# Patient Record
Sex: FEMALE | Race: WHITE | Employment: OTHER | URBAN - METROPOLITAN AREA
[De-identification: names, ages, dates, MRNs, and addresses within clinical notes are randomized per-mention and may not be internally consistent; named-entity substitution may affect disease eponyms.]

---

## 2018-01-31 ENCOUNTER — TELEPHONE (OUTPATIENT)
Dept: DERMATOLOGY | Facility: AMBULATORY SURGERY CENTER | Age: 73
End: 2018-01-31

## 2018-01-31 NOTE — TELEPHONE ENCOUNTER
Mohs Pre-Op Assessment    Patient Appointment Date: Mon 2/12 @ 2    Erenest Level, 68 y.o., female      does confirm site: lower posterior neck  Brief description of tumor: BCC  does not ID site. (Can they still visibly see the site)  does not have Hepatitis C   does not have HIV (If YES, set up consult appointment)    Allergies:  No Known Allergies    does not have an Electrical Implanted Device (Pacemaker, AICD, brain stimulator, etc.)    does not need antibiotics      is not taking NSAIDs    is taking aspirin  If yes, is taking because of prevention (i.e. heart attack, stroke, TIA, bypass surgery, etc.)    is not taking Garlic  is not taking Ginkgo  is not taking Ginseng  is not taking Fish oils  is not taking Vit E    does not take a blood thinner(i.e. Coumadin/Warfarin, Plavix, Brilinta, Pradaxa, Xarelto, Effient)  If taking Coumadin needs to have PT/INR drawn and faxed results within a week of surgery    Pre operative assessment questions asked to patient. Patient has a general understanding of the procedure, and has been versed that there will be local anesthesia used in the procedure and that She will be ok to drive themselves to and from the appointment. Patient has been notified to arrive 15-20 minutes early and they may eat or drink before arriving.

## 2018-02-12 ENCOUNTER — OFFICE VISIT (OUTPATIENT)
Dept: DERMATOLOGY | Facility: AMBULATORY SURGERY CENTER | Age: 73
End: 2018-02-12

## 2018-02-12 VITALS
OXYGEN SATURATION: 97 % | HEART RATE: 65 BPM | RESPIRATION RATE: 16 BRPM | HEIGHT: 63 IN | BODY MASS INDEX: 28.35 KG/M2 | TEMPERATURE: 98.4 F | SYSTOLIC BLOOD PRESSURE: 126 MMHG | WEIGHT: 160 LBS | DIASTOLIC BLOOD PRESSURE: 86 MMHG

## 2018-02-12 DIAGNOSIS — C44.41 BASAL CELL CARCINOMA OF NECK: Primary | ICD-10-CM

## 2018-02-12 RX ORDER — PANTOPRAZOLE SODIUM 40 MG/1
40 TABLET, DELAYED RELEASE ORAL DAILY
COMMUNITY

## 2018-02-12 RX ORDER — ATORVASTATIN CALCIUM 20 MG/1
TABLET, FILM COATED ORAL DAILY
COMMUNITY

## 2018-02-12 NOTE — PROGRESS NOTES
Beau Jimenez is a 68 y.o. female    No chief complaint on file. 1. Have you been to the ER, urgent care clinic since your last visit? Hospitalized since your last visit? no    2. Have you seen or consulted any other health care providers outside of the [address] [place] [address] since your last visit? Include any pap smears or colon screening.   No

## 2018-02-12 NOTE — PATIENT INSTRUCTIONS
WOUND CARE INSTRUCTIONS    1. Keep the dressing clean and dry and do not remove for 48 hours. 2. Then change the dressing once a day as follows:  a. Wash hands before and after each dressing change. b. Remove dressing and wash site gently with mild soap and water, rinse, and pat dry.  c. Apply an ointment (Bacitracin, Polysporin, Neosporin, Petroleum jelly or Aquaphor). d. Apply a non-stick (Telfa) dressing or Band-Aid to cover the wound. Remove pressure bandage on wednesday, then wash site gently. Leave steri-strips in place until it naturally begins to peel off, about 4-6 days, then remove. If steri-strips peel off prior to 1 week, apply vaseline and a band-aid to site daily for the remainder of the week. 3. Watch for:  BLEEDING: A small amount of drainage may occur. If bleeding occurs, elevate and rest the surgery site. Apply gauze and steady pressure for 15 minutes. If bleeding continues, call this office. INFECTION: Signs of infection include increased redness, pain, warmth, drainage of pus, and fever. If this occurs, call this office. 4. Special Instructions (follow any that are checked):  · [x] You have stitches that DO NOT need to be removed. · [x] Avoid bending at the waist and heavy lifting for two days. · [x] Sleep with your head elevated for the next two nights. · [x] Rest the surgery site and keep it elevated as much as possible for two days. · [] You may apply an ice-pack for 10-15 minutes every waking hour for the rest of the day. · [] Eat a soft diet and avoid hot food and hot drinks for the rest of the day. · [] Other instructions: Follow up as directed. Take Tylenol or Ibuprofen for pain as needed. Once the site is healed with no remaining bandages or open areas, protect your surgical site and scar from the sun, as this area will be more sensitive.   Use a broad spectrum sunscreen SPF 30 or higher daily, and a chemical free product (one containing zinc oxide or titanium dioxide) is a good choice if the area is sensitive. You may begin to gently massage the surgical site in 2-3 weeks, rubbing in a circular motion along the scar. This can help reduce swelling and thickness of a scar. A scar cream may be used beginnning 1 month after the surgery. If you have any questions or concerns, please call our office Monday through Friday at [phone].

## 2018-02-12 NOTE — MR AVS SNAPSHOT
[address] [facility] [address] [place] [id number] Patient: Rocío Jacob MRN: [id number] Visit Information Date & Time Provider Department Dept. Phone Encounter #  
 2/12/2018  2:00 PM MD Bryan Yoonjanelle [id number] [phone] [id number] Upcoming Health Maintenance Date Due Hepatitis C Screening 1945 DTaP/Tdap/Td series (1 - Tdap) 1/18/1966 FOBT Q 1 YEAR AGE 50-75 1/18/1995 ZOSTER VACCINE AGE 60> 11/18/2004 GLAUCOMA SCREENING Q2Y 1/18/2010 Pneumococcal 65+ Low/Medium Risk (1 of 2 - PCV13) 1/18/2010 MEDICARE YEARLY EXAM 1/18/2010 Influenza Age 5 to Adult 8/1/2017 BREAST CANCER SCRN MAMMOGRAM 10/14/2018 Allergies as of 2/12/2018  Review Complete On: 2/12/2018 By: Gita Richard MD  
 No Known Allergies Current Immunizations  Never Reviewed No immunizations on file. Not reviewed this visit You Were Diagnosed With   
  
 Codes Comments Basal cell carcinoma of neck    -  Primary ICD-10-CM: C44.41 
ICD-9-CM: 173.41 Vitals BP Pulse Temp Resp Height(growth percentile) Weight(growth percentile) 126/86 (BP 1 Location: Left arm, BP Patient Position: Sitting) 65 98.4 °F (36.9 °C) (Oral) 16 5' 3\" (1.6 m) 160 lb (72.6 kg) SpO2 BMI Smoking Status 97% 28.34 kg/m2 Never Smoker Vitals History BMI and BSA Data Body Mass Index Body Surface Area  
 28.34 kg/m 2 1.8 m 2 Preferred Pharmacy Pharmacy Name Phone [address] [facility] [phone] Your Updated Medication List  
  
   
This list is accurate as of: 2/12/18  3:30 PM.  Always use your most recent med list.  
  
  
  
  
 atorvastatin 20 mg tablet Commonly known as:  LIPITOR Take  by mouth daily. hydroCHLOROthiazide 25 mg tablet Commonly known as:  HYDRODIURIL  
 Take 25 mg by mouth daily. KLOR-CON M20 20 mEq tablet Generic drug:  potassium chloride Take 20 mEq by mouth daily. MIMVEY 1-0.5 mg per tablet Generic drug:  estradiol-norethindrone Take 1 Tab by mouth daily. pantoprazole 40 mg tablet Commonly known as:  PROTONIX Take 40 mg by mouth daily. pravastatin 40 mg tablet Commonly known as:  PRAVACHOL Take 40 mg by mouth daily. PREMPRO 0.3-1.5 mg Tab Generic drug:  estrogen (conjugated)-medroxyPROGESTERone Take 1 Tab by mouth daily. Patient Instructions WOUND CARE INSTRUCTIONS 1. Keep the dressing clean and dry and do not remove for 48 hours. 2. Then change the dressing once a day as follows: 
a. Wash hands before and after each dressing change. b. Remove dressing and wash site gently with mild soap and water, rinse, and pat dry. 
c. Apply an ointment (Bacitracin, Polysporin, Neosporin, Petroleum jelly or Aquaphor). d. Apply a non-stick (Telfa) dressing or Band-Aid to cover the wound. Remove pressure bandage on wednesday, then wash site gently. Leave steri-strips in place until it naturally begins to peel off, about 4-6 days, then remove. If steri-strips peel off prior to 1 week, apply vaseline and a band-aid to site daily for the remainder of the week. 3. Watch for: BLEEDING: A small amount of drainage may occur. If bleeding occurs, elevate and rest the surgery site. Apply gauze and steady pressure for 15 minutes. If bleeding continues, call this office. INFECTION: Signs of infection include increased redness, pain, warmth, drainage of pus, and fever. If this occurs, call this office. 4. Special Instructions (follow any that are checked): ·  You have stitches that DO NOT need to be removed. ·  Avoid bending at the waist and heavy lifting for two days. ·  Sleep with your head elevated for the next two nights.  
·  Rest the surgery site and keep it elevated as much as possible for two days. 
·  You may apply an ice-pack for 10-15 minutes every waking hour for the rest of the day. ·  Eat a soft diet and avoid hot food and hot drinks for the rest of the day. ·  Other instructions: Follow up as directed. Take Tylenol or Ibuprofen for pain as needed. Once the site is healed with no remaining bandages or open areas, protect your surgical site and scar from the sun, as this area will be more sensitive. Use a broad spectrum sunscreen SPF 30 or higher daily, and a chemical free product (one containing zinc oxide or titanium dioxide) is a good choice if the area is sensitive. You may begin to gently massage the surgical site in 2-3 weeks, rubbing in a circular motion along the scar. This can help reduce swelling and thickness of a scar. A scar cream may be used beginnning 1 month after the surgery. If you have any questions or concerns, please call our office Monday through Friday at [phone]. Introducing [facility]! Akira Vargas introduces [employer] patient portal. Now you can access parts of your medical record, email your doctor's office, and request medication refills online. 1. In your internet browser, go to [email or web address]. [facility] 2. Click on the First Time User? Click Here link in the Sign In box. You will see the New Member Sign Up page. 3. Enter your [employer] Access Code exactly as it appears below. You will not need to use this code after youve completed the sign-up process. If you do not sign up before the expiration date, you must request a new code. · [employer] Access Code: [email or web address] Expires: 5/6/2018  3:39 PM 
 
4. Enter the last four digits of your Social Security Number (xxxx) and Date of Birth (mm/dd/yyyy) as indicated and click Submit. You will be taken to the next sign-up page. 5. Create a [employer] ID. This will be your [employer] login ID and cannot be changed, so think of one that is secure and easy to remember. 6. Create a [employer] password. You can change your password at any time. 7. Enter your Password Reset Question and Answer. This can be used at a later time if you forget your password. 8. Enter your e-mail address. You will receive e-mail notification when new information is available in [address] 9. Click Sign Up. You can now view and download portions of your medical record. 10. Click the Download Summary menu link to download a portable copy of your medical information. If you have questions, please visit the Frequently Asked Questions section of the [employer] website. Remember, [employer] is NOT to be used for urgent needs. For medical emergencies, dial 911. Now available from your iPhone and Android! Please provide this summary of care documentation to your next provider. If you have any questions after today's visit, please call [phone].

## 2018-02-12 NOTE — PROGRESS NOTES
This note is written by Bing Shafer, as dictated by Tenzin Rosales. Saleem Batista MD.    CC: Basal cell carcinoma on the posterior base of neck     History of present illness:     Collette Climes is a 68 y.o. female referred by Dr. Sherita Longo. She has a biopsy-proven nodular and micronodular basal cell carcinoma on the posterior base of neck. This is a new basal cell carcinoma present for many months described as an itching lesion with no prior treatment. Biopsy confirmed the diagnosis of basal cell carcinoma, and I reviewed the written pathology. She is feeling well and in her usual state of health today. She has no pain, no current illnesses, no other skin concerns. Her allergies, medications, medical, and social history are reviewed by me today. Exam:     She is an awake, alert, and oriented 68 y.o. female who appears well and in no distress. There is no preauricular, submandibular, or cervical lymphadenopathy. I examined her neck. She has a 10 x 6 mm indistinct pink scar-like lesion on her posterior base of neck. She confirms location. Assessment/plan:    1. Basal cell carcinoma, posterior base of neck. I discussed the diagnosis of basal cell carcinoma and summarized the pathology report. Mohs surgery is indicated by site, size, poor definition, and histology. The procedure was discussed, verbal and written consent were obtained. I performed the procedure. One stage was required to reach a tumor free plane. The surgical defect was managed with intermediate repair. There were no complications. She will follow up as needed as the site heals. Indications, risks, and options were discussed with Collette Climes preoperatively. Risks including, but not limited to: pain, bleeding, infection, tumor recurrence, scarring and damage to motor and/or sensory nerves, were discussed. Collette Climes chose Mohs surgery. Collette Climes was an acceptable surgery candidate.     Collette Climes was placed in the appropriate position on the operating table in the Mohs surgery procedure room. The area was prepped and draped in the standard manner. Gentian violet was used to outline the clinical margins of the tumor. Local anesthesia was then obtained. The grossly visible tumor was then removed, an underlying layer was excised and mapped according to the Mohs technique, and the individual specimens examined microscopically. The process was repeated until microscopic examination of the tissue specimens confirmed a tumor-free plane. Hemostasis was obtained with electrosurgery and pressure. The wound was covered between stages with moist saline gauze. The wound management options of second intent healing, layered closure, local flap, and/or full thickness skin graft were discussed. Valarie Clemens understands the aims, risks, alternatives, and possible complications and elects to proceed with an intermediate layered closure. Wound margins were made vertical, edges undermined in the subcutaneous plane, standing cones corrected at both poles followed by layered closure. The wound was closed with buried 4-0 polysorb suture in the subcutis to reduce tension on the skin edges, and skin edges were approximated with 4-0 polysorb suture in the dermis to reduce tension on the epidermis. The final closure length was 31 mm. The wound was bandaged with steristrips, Telfa, gauze and Coverroll. Wound care instructions (written and verbal) and a follow up appointment were given to Valarie Clemens before discharge. Valarie Clemens was discharged in good condition. 2. History of nonmelanoma skin cancer. I discussed the diagnosis and recommend routine examinations with Dr. Jhonnie Fabry for surveillance. The documentation recorded by the scribe accurately reflects the service I personally performed and the decisions made by me.      [facility]   OFFICE PROCEDURE PROGRESS NOTE     Chart reviewed for the Hector Adams MD, have reviewed the History, Physical and updated the Allergic reactions for Fran performed immediately prior to start of procedure:     Linda Mendez. Andreas Adams MD, have performed the following reviews on Philomena Keane prior to the start of the procedure:     * Patient was identified by name and date of birth   * Agreement on procedure being performed was verified   * Risks and Benefits explained to the patient   * Procedure site verified and marked as necessary   * Patient was positioned for comfort   * Consent was signed and verified     Time: 2:24 PM   Date of procedure: 2/12/2018  Procedure performed by: Trang Adams MD   Provider assisted by: LPN   Patient assisted by: self   How tolerated by patient: tolerated the procedure well with no complications   Comments: none

## 2018-04-24 ENCOUNTER — HOSPITAL ENCOUNTER (OUTPATIENT)
Dept: MAMMOGRAPHY | Age: 73
Discharge: HOME | End: 2018-04-24
Attending: NURSE PRACTITIONER
Payer: MEDICARE

## 2018-04-24 DIAGNOSIS — Z12.39 BREAST SCREENING: ICD-10-CM

## 2018-04-24 PROCEDURE — 77067 SCR MAMMO BI INCL CAD: CPT

## 2019-01-11 ENCOUNTER — APPOINTMENT (OUTPATIENT)
Dept: GENERAL RADIOLOGY | Age: 74
DRG: 482 | End: 2019-01-11
Attending: EMERGENCY MEDICINE
Payer: MEDICARE

## 2019-01-11 ENCOUNTER — HOSPITAL ENCOUNTER (INPATIENT)
Age: 74
LOS: 4 days | Discharge: HOME HEALTH | DRG: 482 | End: 2019-01-15
Attending: EMERGENCY MEDICINE | Admitting: HOSPITALIST
Payer: MEDICARE

## 2019-01-11 DIAGNOSIS — S72.001A CLOSED FRACTURE OF RIGHT HIP, INITIAL ENCOUNTER (HCC): Primary | ICD-10-CM

## 2019-01-11 PROBLEM — S72.009A HIP FRACTURE (HCC): Status: ACTIVE | Noted: 2019-01-11

## 2019-01-11 LAB
ALBUMIN SERPL-MCNC: 3.6 G/DL (ref 3.5–5)
ALBUMIN/GLOB SERPL: 1 {RATIO} (ref 1.1–2.2)
ALP SERPL-CCNC: 74 U/L (ref 45–117)
ALT SERPL-CCNC: 22 U/L (ref 12–78)
ANION GAP SERPL CALC-SCNC: 14 MMOL/L (ref 5–15)
APPEARANCE UR: CLEAR
APTT PPP: 21.7 SEC (ref 22.1–32)
AST SERPL-CCNC: 23 U/L (ref 15–37)
BACTERIA URNS QL MICRO: NEGATIVE /HPF
BASOPHILS # BLD: 0 K/UL (ref 0–0.1)
BASOPHILS NFR BLD: 0 % (ref 0–1)
BILIRUB SERPL-MCNC: 0.6 MG/DL (ref 0.2–1)
BILIRUB UR QL: NEGATIVE
BUN SERPL-MCNC: 13 MG/DL (ref 6–20)
BUN/CREAT SERPL: 12 (ref 12–20)
CALCIUM SERPL-MCNC: 9.6 MG/DL (ref 8.5–10.1)
CHLORIDE SERPL-SCNC: 100 MMOL/L (ref 97–108)
CO2 SERPL-SCNC: 26 MMOL/L (ref 21–32)
COLOR UR: ABNORMAL
CREAT SERPL-MCNC: 1.06 MG/DL (ref 0.55–1.02)
CREAT SERPL-MCNC: 1.09 MG/DL (ref 0.55–1.02)
DIFFERENTIAL METHOD BLD: ABNORMAL
EOSINOPHIL # BLD: 0 K/UL (ref 0–0.4)
EOSINOPHIL NFR BLD: 0 % (ref 0–7)
EPITH CASTS URNS QL MICRO: ABNORMAL /LPF
ERYTHROCYTE [DISTWIDTH] IN BLOOD BY AUTOMATED COUNT: 13.6 % (ref 11.5–14.5)
GLOBULIN SER CALC-MCNC: 3.5 G/DL (ref 2–4)
GLUCOSE SERPL-MCNC: 136 MG/DL (ref 65–100)
GLUCOSE UR STRIP.AUTO-MCNC: NEGATIVE MG/DL
HCT VFR BLD AUTO: 37.9 % (ref 35–47)
HGB BLD-MCNC: 12.3 G/DL (ref 11.5–16)
HGB UR QL STRIP: NEGATIVE
IMM GRANULOCYTES # BLD AUTO: 0.2 K/UL (ref 0–0.04)
IMM GRANULOCYTES NFR BLD AUTO: 1 % (ref 0–0.5)
INR PPP: 1 (ref 0.9–1.1)
KETONES UR QL STRIP.AUTO: 15 MG/DL
LEUKOCYTE ESTERASE UR QL STRIP.AUTO: NEGATIVE
LYMPHOCYTES # BLD: 0.8 K/UL (ref 0.8–3.5)
LYMPHOCYTES NFR BLD: 5 % (ref 12–49)
MAGNESIUM SERPL-MCNC: 2 MG/DL (ref 1.6–2.4)
MCH RBC QN AUTO: 28 PG (ref 26–34)
MCHC RBC AUTO-ENTMCNC: 32.5 G/DL (ref 30–36.5)
MCV RBC AUTO: 86.1 FL (ref 80–99)
MONOCYTES # BLD: 0.8 K/UL (ref 0–1)
MONOCYTES NFR BLD: 5 % (ref 5–13)
NEUTS SEG # BLD: 14.1 K/UL (ref 1.8–8)
NEUTS SEG NFR BLD: 89 % (ref 32–75)
NITRITE UR QL STRIP.AUTO: NEGATIVE
NRBC # BLD: 0 K/UL (ref 0–0.01)
NRBC BLD-RTO: 0 PER 100 WBC
PH UR STRIP: 6 [PH] (ref 5–8)
PLATELET # BLD AUTO: 194 K/UL (ref 150–400)
PMV BLD AUTO: 10.5 FL (ref 8.9–12.9)
POTASSIUM SERPL-SCNC: 2.9 MMOL/L (ref 3.5–5.1)
PROT SERPL-MCNC: 7.1 G/DL (ref 6.4–8.2)
PROT UR STRIP-MCNC: NEGATIVE MG/DL
PROTHROMBIN TIME: 9.8 SEC (ref 9–11.1)
RBC # BLD AUTO: 4.4 M/UL (ref 3.8–5.2)
RBC #/AREA URNS HPF: ABNORMAL /HPF (ref 0–5)
RBC MORPH BLD: ABNORMAL
SODIUM SERPL-SCNC: 140 MMOL/L (ref 136–145)
SP GR UR REFRACTOMETRY: 1.02 (ref 1–1.03)
THERAPEUTIC RANGE,PTTT: ABNORMAL SECS (ref 58–77)
UR CULT HOLD, URHOLD: NORMAL
UROBILINOGEN UR QL STRIP.AUTO: 0.2 EU/DL (ref 0.2–1)
WBC # BLD AUTO: 15.9 K/UL (ref 3.6–11)
WBC URNS QL MICRO: ABNORMAL /HPF (ref 0–4)

## 2019-01-11 PROCEDURE — 74011250637 HC RX REV CODE- 250/637: Performed by: HOSPITALIST

## 2019-01-11 PROCEDURE — 80053 COMPREHEN METABOLIC PANEL: CPT

## 2019-01-11 PROCEDURE — 93005 ELECTROCARDIOGRAM TRACING: CPT

## 2019-01-11 PROCEDURE — 99285 EMERGENCY DEPT VISIT HI MDM: CPT

## 2019-01-11 PROCEDURE — 85730 THROMBOPLASTIN TIME PARTIAL: CPT

## 2019-01-11 PROCEDURE — 85610 PROTHROMBIN TIME: CPT

## 2019-01-11 PROCEDURE — 74011250636 HC RX REV CODE- 250/636: Performed by: EMERGENCY MEDICINE

## 2019-01-11 PROCEDURE — 74011250636 HC RX REV CODE- 250/636: Performed by: HOSPITALIST

## 2019-01-11 PROCEDURE — 74011250637 HC RX REV CODE- 250/637: Performed by: EMERGENCY MEDICINE

## 2019-01-11 PROCEDURE — 83735 ASSAY OF MAGNESIUM: CPT

## 2019-01-11 PROCEDURE — 85025 COMPLETE CBC W/AUTO DIFF WBC: CPT

## 2019-01-11 PROCEDURE — 71045 X-RAY EXAM CHEST 1 VIEW: CPT

## 2019-01-11 PROCEDURE — 81001 URINALYSIS AUTO W/SCOPE: CPT

## 2019-01-11 PROCEDURE — 65270000029 HC RM PRIVATE

## 2019-01-11 PROCEDURE — 73552 X-RAY EXAM OF FEMUR 2/>: CPT

## 2019-01-11 PROCEDURE — 36415 COLL VENOUS BLD VENIPUNCTURE: CPT

## 2019-01-11 PROCEDURE — 73502 X-RAY EXAM HIP UNI 2-3 VIEWS: CPT

## 2019-01-11 RX ORDER — POTASSIUM CHLORIDE 750 MG/1
40 TABLET, FILM COATED, EXTENDED RELEASE ORAL
Status: COMPLETED | OUTPATIENT
Start: 2019-01-11 | End: 2019-01-11

## 2019-01-11 RX ORDER — NALOXONE HYDROCHLORIDE 0.4 MG/ML
0.4 INJECTION, SOLUTION INTRAMUSCULAR; INTRAVENOUS; SUBCUTANEOUS AS NEEDED
Status: DISCONTINUED | OUTPATIENT
Start: 2019-01-11 | End: 2019-01-15 | Stop reason: HOSPADM

## 2019-01-11 RX ORDER — FENTANYL CITRATE 50 UG/ML
25 INJECTION, SOLUTION INTRAMUSCULAR; INTRAVENOUS
Status: DISCONTINUED | OUTPATIENT
Start: 2019-01-11 | End: 2019-01-15 | Stop reason: HOSPADM

## 2019-01-11 RX ORDER — POTASSIUM CHLORIDE 14.9 MG/ML
10 INJECTION INTRAVENOUS
Status: COMPLETED | OUTPATIENT
Start: 2019-01-11 | End: 2019-01-11

## 2019-01-11 RX ORDER — GUAIFENESIN 100 MG/5ML
81 LIQUID (ML) ORAL DAILY
COMMUNITY
End: 2019-01-15

## 2019-01-11 RX ORDER — HYDROMORPHONE HYDROCHLORIDE 2 MG/ML
1 INJECTION, SOLUTION INTRAMUSCULAR; INTRAVENOUS; SUBCUTANEOUS ONCE
Status: COMPLETED | OUTPATIENT
Start: 2019-01-11 | End: 2019-01-11

## 2019-01-11 RX ORDER — ATORVASTATIN CALCIUM 20 MG/1
20 TABLET, FILM COATED ORAL DAILY
Status: DISCONTINUED | OUTPATIENT
Start: 2019-01-12 | End: 2019-01-15 | Stop reason: HOSPADM

## 2019-01-11 RX ORDER — POTASSIUM CHLORIDE 1.5 G/1.77G
40 POWDER, FOR SOLUTION ORAL 2 TIMES DAILY WITH MEALS
Status: DISCONTINUED | OUTPATIENT
Start: 2019-01-11 | End: 2019-01-15 | Stop reason: HOSPADM

## 2019-01-11 RX ORDER — SODIUM CHLORIDE 0.9 % (FLUSH) 0.9 %
5-40 SYRINGE (ML) INJECTION EVERY 8 HOURS
Status: DISCONTINUED | OUTPATIENT
Start: 2019-01-11 | End: 2019-01-15 | Stop reason: HOSPADM

## 2019-01-11 RX ORDER — PANTOPRAZOLE SODIUM 40 MG/1
40 TABLET, DELAYED RELEASE ORAL DAILY
Status: DISCONTINUED | OUTPATIENT
Start: 2019-01-12 | End: 2019-01-15 | Stop reason: HOSPADM

## 2019-01-11 RX ORDER — OXYCODONE HYDROCHLORIDE 5 MG/1
5 TABLET ORAL
Status: DISCONTINUED | OUTPATIENT
Start: 2019-01-11 | End: 2019-01-15 | Stop reason: HOSPADM

## 2019-01-11 RX ORDER — CYCLOBENZAPRINE HCL 10 MG
5 TABLET ORAL
Status: DISCONTINUED | OUTPATIENT
Start: 2019-01-11 | End: 2019-01-15 | Stop reason: HOSPADM

## 2019-01-11 RX ORDER — ONDANSETRON 2 MG/ML
4 INJECTION INTRAMUSCULAR; INTRAVENOUS
Status: COMPLETED | OUTPATIENT
Start: 2019-01-11 | End: 2019-01-11

## 2019-01-11 RX ORDER — SODIUM CHLORIDE 0.9 % (FLUSH) 0.9 %
5-40 SYRINGE (ML) INJECTION AS NEEDED
Status: DISCONTINUED | OUTPATIENT
Start: 2019-01-11 | End: 2019-01-15 | Stop reason: HOSPADM

## 2019-01-11 RX ORDER — METOPROLOL SUCCINATE 25 MG/1
12.5 TABLET, EXTENDED RELEASE ORAL DAILY
Status: DISCONTINUED | OUTPATIENT
Start: 2019-01-12 | End: 2019-01-15 | Stop reason: HOSPADM

## 2019-01-11 RX ORDER — FENTANYL CITRATE 50 UG/ML
50 INJECTION, SOLUTION INTRAMUSCULAR; INTRAVENOUS
Status: DISCONTINUED | OUTPATIENT
Start: 2019-01-11 | End: 2019-01-15 | Stop reason: HOSPADM

## 2019-01-11 RX ORDER — METOPROLOL SUCCINATE 25 MG/1
12.5 TABLET, EXTENDED RELEASE ORAL DAILY
COMMUNITY

## 2019-01-11 RX ORDER — ACYCLOVIR 200 MG/1
200 CAPSULE ORAL
COMMUNITY

## 2019-01-11 RX ADMIN — Medication 10 ML: at 23:32

## 2019-01-11 RX ADMIN — HYDROMORPHONE HYDROCHLORIDE 1 MG: 2 INJECTION INTRAMUSCULAR; INTRAVENOUS; SUBCUTANEOUS at 20:34

## 2019-01-11 RX ADMIN — ONDANSETRON 4 MG: 2 INJECTION INTRAMUSCULAR; INTRAVENOUS at 20:31

## 2019-01-11 RX ADMIN — POTASSIUM CHLORIDE 10 MEQ: 200 INJECTION, SOLUTION INTRAVENOUS at 19:57

## 2019-01-11 RX ADMIN — POTASSIUM CHLORIDE 40 MEQ: 1.5 POWDER, FOR SOLUTION ORAL at 23:30

## 2019-01-11 RX ADMIN — POTASSIUM CHLORIDE 40 MEQ: 750 TABLET, FILM COATED, EXTENDED RELEASE ORAL at 19:57

## 2019-01-11 RX ADMIN — FENTANYL CITRATE 50 MCG: 50 INJECTION, SOLUTION INTRAMUSCULAR; INTRAVENOUS at 23:22

## 2019-01-11 NOTE — ED TRIAGE NOTES
Triage note: Pt arrived by EMS. States she was going to be her pig, the pig began to move around and she stepped down a step to miss him, and fell. Pt denies hitting head with fall. States she was unable to get herself up and ambulate so she \"scooted\" across the ground into the house. Pt complains of right hip pain. 5/10

## 2019-01-11 NOTE — ED PROVIDER NOTES
The history is provided by the patient and a relative. Hip Injury This is a new problem. The current episode started 3 to 5 hours ago. The problem occurs constantly. The problem has not changed since onset. The pain is present in the right hip. The pain is at a severity of 5/10. The pain is moderate. Associated symptoms include limited range of motion. Pertinent negatives include no numbness, no stiffness, no tingling, no itching, no back pain and no neck pain. The symptoms are aggravated by contact, movement and palpation. She has tried nothing for the symptoms. The treatment provided no relief. There has been a history of trauma. Past Medical History:  
Diagnosis Date  Cancer ([address] skin- neck face  High blood pressure  Hypertension  Other ill-defined conditions(799.89)   
 blood in urine being eval. to this date  Skin cancer Past Surgical History:  
Procedure Laterality Date  HX GYN    
 lap BTL  HX MOHS PROCEDURES  02/12/2018 BCC posterior base of neck by Dr. Evette Schumacher Family History:  
Problem Relation Age of Onset  Heart Disease Father  Kidney Disease Father Social History Socioeconomic History  Marital status:  Spouse name: Not on file  Number of children: Not on file  Years of education: Not on file  Highest education level: Not on file Social Needs  Financial resource strain: Not on file  Food insecurity - worry: Not on file  Food insecurity - inability: Not on file  Transportation needs - medical: Not on file  Transportation needs - non-medical: Not on file Occupational History  Not on file Tobacco Use  Smoking status: Never Smoker  Smokeless tobacco: Never Used Substance and Sexual Activity  Alcohol use: No  
 Drug use: No  
 Sexual activity: Not on file Other Topics Concern  Not on file Social History Narrative  Not on file ALLERGIES: Patient has no known allergies. Review of Systems Constitutional: Negative for activity change, chills and fever. HENT: Negative for nosebleeds, sore throat, trouble swallowing and voice change. Eyes: Negative for visual disturbance. Respiratory: Negative for shortness of breath. Cardiovascular: Negative for chest pain and palpitations. Gastrointestinal: Negative for abdominal pain, constipation, diarrhea and nausea. Genitourinary: Negative for difficulty urinating, dysuria, hematuria and urgency. Musculoskeletal: Positive for arthralgias and gait problem. Negative for back pain, neck pain, neck stiffness and stiffness. Skin: Negative for color change and itching. Allergic/Immunologic: Negative for immunocompromised state. Neurological: Negative for dizziness, tingling, seizures, syncope, weakness, light-headedness, numbness and headaches. Psychiatric/Behavioral: Negative for behavioral problems, confusion, hallucinations, self-injury and suicidal ideas. Vitals:  
 01/11/19 1741 BP: 161/75 Pulse: 87 Resp: 16 Temp: 99 °F (37.2 °C) SpO2: 96% Weight: 75.2 kg (165 lb 12.6 oz) Physical Exam  
Constitutional: She is oriented to person, place, and time. She appears well-developed and well-nourished. No distress. HENT:  
Head: Normocephalic and atraumatic. Eyes: Pupils are equal, round, and reactive to light. Neck: Normal range of motion. Neck supple. Cardiovascular: Normal rate, regular rhythm and normal heart sounds. Exam reveals no gallop and no friction rub. No murmur heard. Pulmonary/Chest: Effort normal and breath sounds normal. No respiratory distress. She has no wheezes. Abdominal: Soft. Bowel sounds are normal. She exhibits no distension. There is no tenderness. There is no rebound and no guarding. Musculoskeletal:  
     Right hip: She exhibits decreased range of motion, decreased strength, tenderness and bony tenderness. Neurological: She is alert and oriented to person, place, and time. Skin: Skin is warm. No rash noted. She is not diaphoretic. Psychiatric: She has a normal mood and affect. Her behavior is normal. Judgment and thought content normal.  
Nursing note and vitals reviewed. MDM This is a 44-year-old female with past medical history, review of systems, physical exam as above, presenting with complaints of right hip pain, secondary to ground level fall. Patient states she drinks a trip and fall while outside, landing on her right hip with immediate onset of pain. Patient states she was immobile, outside, exposed to the illness for approximately 2 hours, before rescue. She denies other injury, denies chest pain, shortness of breath, or other inciting event. She denies a history of orthopedic surgery. Physical exam is remarkable for well-appearing elderly female, in no acute distress, with right lower extremity, distal pulse motor and sensation intact, free range of motion of the right ankle, right knee, however with complaints of right leg, groin, and hip pain, with decreased range of motion, suspicious for fracture versus dislocation. Differential includes fracture, contusion, sprain, dislocation. Patient received fentanyl via EMS, while in route, states pain is well-controlled. Plan to obtain CMP, CBC, EKG, chest x-ray, right lower extremity plain films. We will make a disposition based the patient's diagnostics and response to therapy. Procedures BESIDE SIGN OUT: 
7:07 PM 
Discussed pt's hx, disposition, and available diagnostic and imaging results with Dr. Dean López at bedside with the patient. Reviewed care plans. Both providers and patient are in agreement with care plan. Dr. Iliana Womack is transferring care of the pt to Dr. Dean López at this time.

## 2019-01-11 NOTE — ED NOTES
Pt. Daughter,  at bedside. #1\"  Daughter:  Tamela Magana [phone]  (c) #2 :  Rodrigo Bo [phone] (h)

## 2019-01-12 ENCOUNTER — APPOINTMENT (OUTPATIENT)
Dept: GENERAL RADIOLOGY | Age: 74
DRG: 482 | End: 2019-01-12
Attending: ORTHOPAEDIC SURGERY
Payer: MEDICARE

## 2019-01-12 ENCOUNTER — ANESTHESIA EVENT (OUTPATIENT)
Dept: SURGERY | Age: 74
DRG: 482 | End: 2019-01-12
Payer: MEDICARE

## 2019-01-12 ENCOUNTER — ANESTHESIA (OUTPATIENT)
Dept: SURGERY | Age: 74
DRG: 482 | End: 2019-01-12
Payer: MEDICARE

## 2019-01-12 LAB
ABO + RH BLD: NORMAL
ALBUMIN SERPL-MCNC: 3.2 G/DL (ref 3.5–5)
ALBUMIN/GLOB SERPL: 0.9 {RATIO} (ref 1.1–2.2)
ALP SERPL-CCNC: 66 U/L (ref 45–117)
ALT SERPL-CCNC: 22 U/L (ref 12–78)
ANION GAP SERPL CALC-SCNC: 9 MMOL/L (ref 5–15)
AST SERPL-CCNC: 27 U/L (ref 15–37)
ATRIAL RATE: 77 BPM
BASOPHILS # BLD: 0 K/UL (ref 0–0.1)
BASOPHILS NFR BLD: 0 % (ref 0–1)
BILIRUB SERPL-MCNC: 0.7 MG/DL (ref 0.2–1)
BLOOD GROUP ANTIBODIES SERPL: NORMAL
BUN SERPL-MCNC: 13 MG/DL (ref 6–20)
BUN/CREAT SERPL: 12 (ref 12–20)
CALCIUM SERPL-MCNC: 8.9 MG/DL (ref 8.5–10.1)
CALCULATED P AXIS, ECG09: 72 DEGREES
CALCULATED R AXIS, ECG10: -34 DEGREES
CALCULATED T AXIS, ECG11: 54 DEGREES
CHLORIDE SERPL-SCNC: 103 MMOL/L (ref 97–108)
CO2 SERPL-SCNC: 23 MMOL/L (ref 21–32)
COMMENT, HOLDF: NORMAL
CREAT SERPL-MCNC: 1.07 MG/DL (ref 0.55–1.02)
DIAGNOSIS, 93000: NORMAL
DIFFERENTIAL METHOD BLD: ABNORMAL
EOSINOPHIL # BLD: 0 K/UL (ref 0–0.4)
EOSINOPHIL NFR BLD: 0 % (ref 0–7)
ERYTHROCYTE [DISTWIDTH] IN BLOOD BY AUTOMATED COUNT: 13.4 % (ref 11.5–14.5)
GLOBULIN SER CALC-MCNC: 3.7 G/DL (ref 2–4)
GLUCOSE SERPL-MCNC: 143 MG/DL (ref 65–100)
HCT VFR BLD AUTO: 34.9 % (ref 35–47)
HGB BLD-MCNC: 11.2 G/DL (ref 11.5–16)
IMM GRANULOCYTES # BLD AUTO: 0 K/UL (ref 0–0.04)
IMM GRANULOCYTES NFR BLD AUTO: 0 % (ref 0–0.5)
LYMPHOCYTES # BLD: 0.9 K/UL (ref 0.8–3.5)
LYMPHOCYTES NFR BLD: 11 % (ref 12–49)
MAGNESIUM SERPL-MCNC: 2.1 MG/DL (ref 1.6–2.4)
MCH RBC QN AUTO: 28.3 PG (ref 26–34)
MCHC RBC AUTO-ENTMCNC: 32.1 G/DL (ref 30–36.5)
MCV RBC AUTO: 88.1 FL (ref 80–99)
MONOCYTES # BLD: 0.8 K/UL (ref 0–1)
MONOCYTES NFR BLD: 10 % (ref 5–13)
NEUTS SEG # BLD: 6.2 K/UL (ref 1.8–8)
NEUTS SEG NFR BLD: 78 % (ref 32–75)
NRBC # BLD: 0 K/UL (ref 0–0.01)
NRBC BLD-RTO: 0 PER 100 WBC
P-R INTERVAL, ECG05: 162 MS
PHOSPHATE SERPL-MCNC: 3.2 MG/DL (ref 2.6–4.7)
PLATELET # BLD AUTO: 187 K/UL (ref 150–400)
PMV BLD AUTO: 10.8 FL (ref 8.9–12.9)
POTASSIUM SERPL-SCNC: 3.7 MMOL/L (ref 3.5–5.1)
PROT SERPL-MCNC: 6.9 G/DL (ref 6.4–8.2)
Q-T INTERVAL, ECG07: 380 MS
QRS DURATION, ECG06: 80 MS
QTC CALCULATION (BEZET), ECG08: 430 MS
RBC # BLD AUTO: 3.96 M/UL (ref 3.8–5.2)
SAMPLES BEING HELD,HOLD: NORMAL
SODIUM SERPL-SCNC: 135 MMOL/L (ref 136–145)
SPECIMEN EXP DATE BLD: NORMAL
VENTRICULAR RATE, ECG03: 77 BPM
WBC # BLD AUTO: 8 K/UL (ref 3.6–11)

## 2019-01-12 PROCEDURE — 77030028224 HC PDNG CST BSNM -A: Performed by: ORTHOPAEDIC SURGERY

## 2019-01-12 PROCEDURE — C1769 GUIDE WIRE: HCPCS | Performed by: ORTHOPAEDIC SURGERY

## 2019-01-12 PROCEDURE — 77030020788: Performed by: ORTHOPAEDIC SURGERY

## 2019-01-12 PROCEDURE — C1713 ANCHOR/SCREW BN/BN,TIS/BN: HCPCS | Performed by: ORTHOPAEDIC SURGERY

## 2019-01-12 PROCEDURE — 76210000016 HC OR PH I REC 1 TO 1.5 HR: Performed by: ORTHOPAEDIC SURGERY

## 2019-01-12 PROCEDURE — 77030014405 HC GD ROD RMR SYNT -C: Performed by: ORTHOPAEDIC SURGERY

## 2019-01-12 PROCEDURE — 74011250636 HC RX REV CODE- 250/636: Performed by: HOSPITALIST

## 2019-01-12 PROCEDURE — 77030012935 HC DRSG AQUACEL BMS -B: Performed by: ORTHOPAEDIC SURGERY

## 2019-01-12 PROCEDURE — 74011250636 HC RX REV CODE- 250/636: Performed by: PHYSICIAN ASSISTANT

## 2019-01-12 PROCEDURE — 76060000036 HC ANESTHESIA 2.5 TO 3 HR: Performed by: ORTHOPAEDIC SURGERY

## 2019-01-12 PROCEDURE — 77030026438 HC STYL ET INTUB CARD -A: Performed by: ANESTHESIOLOGY

## 2019-01-12 PROCEDURE — 8E0YXBF COMPUTER ASSISTED PROCEDURE OF LOWER EXTREMITY, WITH FLUOROSCOPY: ICD-10-PCS | Performed by: ORTHOPAEDIC SURGERY

## 2019-01-12 PROCEDURE — 74011250637 HC RX REV CODE- 250/637: Performed by: HOSPITALIST

## 2019-01-12 PROCEDURE — 77030031139 HC SUT VCRL2 J&J -A: Performed by: ORTHOPAEDIC SURGERY

## 2019-01-12 PROCEDURE — 77030011640 HC PAD GRND REM COVD -A: Performed by: ORTHOPAEDIC SURGERY

## 2019-01-12 PROCEDURE — 86900 BLOOD TYPING SEROLOGIC ABO: CPT

## 2019-01-12 PROCEDURE — 76010000131 HC OR TIME 2 TO 2.5 HR: Performed by: ORTHOPAEDIC SURGERY

## 2019-01-12 PROCEDURE — 0QS606Z REPOSITION RIGHT UPPER FEMUR WITH INTRAMEDULLARY INTERNAL FIXATION DEVICE, OPEN APPROACH: ICD-10-PCS | Performed by: ORTHOPAEDIC SURGERY

## 2019-01-12 PROCEDURE — 80053 COMPREHEN METABOLIC PANEL: CPT

## 2019-01-12 PROCEDURE — 83735 ASSAY OF MAGNESIUM: CPT

## 2019-01-12 PROCEDURE — 85025 COMPLETE CBC W/AUTO DIFF WBC: CPT

## 2019-01-12 PROCEDURE — 74011250636 HC RX REV CODE- 250/636

## 2019-01-12 PROCEDURE — 73501 X-RAY EXAM HIP UNI 1 VIEW: CPT

## 2019-01-12 PROCEDURE — 65270000029 HC RM PRIVATE

## 2019-01-12 PROCEDURE — 77030008684 HC TU ET CUF COVD -B: Performed by: ANESTHESIOLOGY

## 2019-01-12 PROCEDURE — 74011000250 HC RX REV CODE- 250

## 2019-01-12 PROCEDURE — 77030008467 HC STPLR SKN COVD -B: Performed by: ORTHOPAEDIC SURGERY

## 2019-01-12 PROCEDURE — 84100 ASSAY OF PHOSPHORUS: CPT

## 2019-01-12 PROCEDURE — 77030002933 HC SUT MCRYL J&J -A: Performed by: ORTHOPAEDIC SURGERY

## 2019-01-12 PROCEDURE — P9045 ALBUMIN (HUMAN), 5%, 250 ML: HCPCS

## 2019-01-12 PROCEDURE — 77030018846 HC SOL IRR STRL H20 ICUM -A: Performed by: ORTHOPAEDIC SURGERY

## 2019-01-12 PROCEDURE — 77030013079 HC BLNKT BAIR HGGR 3M -A: Performed by: ANESTHESIOLOGY

## 2019-01-12 PROCEDURE — 74011250636 HC RX REV CODE- 250/636: Performed by: ORTHOPAEDIC SURGERY

## 2019-01-12 PROCEDURE — 76000 FLUOROSCOPY <1 HR PHYS/QHP: CPT

## 2019-01-12 PROCEDURE — 74011250637 HC RX REV CODE- 250/637: Performed by: PHYSICIAN ASSISTANT

## 2019-01-12 PROCEDURE — 77030039266 HC ADH SKN EXOFIN S2SG -A: Performed by: ORTHOPAEDIC SURGERY

## 2019-01-12 PROCEDURE — 77030018836 HC SOL IRR NACL ICUM -A: Performed by: ORTHOPAEDIC SURGERY

## 2019-01-12 PROCEDURE — 36415 COLL VENOUS BLD VENIPUNCTURE: CPT

## 2019-01-12 DEVICE — IMPLANTABLE DEVICE: Type: IMPLANTABLE DEVICE | Site: HIP | Status: FUNCTIONAL

## 2019-01-12 DEVICE — NAIL IM L340MM DIA10MM 130DEG LNG R PROX FEM GRN TI CANN: Type: IMPLANTABLE DEVICE | Site: HIP | Status: FUNCTIONAL

## 2019-01-12 DEVICE — SCREW BNE L40MM DIA5MM TIB LT GRN TI ST CANN LOK FULL THRD: Type: IMPLANTABLE DEVICE | Site: HIP | Status: FUNCTIONAL

## 2019-01-12 DEVICE — SCREW BNE L36MM DIA5MM NONSTERILE TIB LT GRN TI ST CANN LOK: Type: IMPLANTABLE DEVICE | Site: HIP | Status: FUNCTIONAL

## 2019-01-12 RX ORDER — FENTANYL CITRATE 50 UG/ML
25 INJECTION, SOLUTION INTRAMUSCULAR; INTRAVENOUS
Status: DISCONTINUED | OUTPATIENT
Start: 2019-01-12 | End: 2019-01-12 | Stop reason: HOSPADM

## 2019-01-12 RX ORDER — MIDAZOLAM HYDROCHLORIDE 1 MG/ML
0.5 INJECTION, SOLUTION INTRAMUSCULAR; INTRAVENOUS
Status: DISCONTINUED | OUTPATIENT
Start: 2019-01-12 | End: 2019-01-12 | Stop reason: HOSPADM

## 2019-01-12 RX ORDER — ROCURONIUM BROMIDE 10 MG/ML
INJECTION, SOLUTION INTRAVENOUS AS NEEDED
Status: DISCONTINUED | OUTPATIENT
Start: 2019-01-12 | End: 2019-01-12 | Stop reason: HOSPADM

## 2019-01-12 RX ORDER — SODIUM CHLORIDE 9 MG/ML
125 INJECTION, SOLUTION INTRAVENOUS CONTINUOUS
Status: DISPENSED | OUTPATIENT
Start: 2019-01-12 | End: 2019-01-13

## 2019-01-12 RX ORDER — HYDROMORPHONE HYDROCHLORIDE 2 MG/ML
INJECTION, SOLUTION INTRAMUSCULAR; INTRAVENOUS; SUBCUTANEOUS AS NEEDED
Status: DISCONTINUED | OUTPATIENT
Start: 2019-01-12 | End: 2019-01-12 | Stop reason: HOSPADM

## 2019-01-12 RX ORDER — MIDAZOLAM HYDROCHLORIDE 1 MG/ML
INJECTION, SOLUTION INTRAMUSCULAR; INTRAVENOUS AS NEEDED
Status: DISCONTINUED | OUTPATIENT
Start: 2019-01-12 | End: 2019-01-12 | Stop reason: HOSPADM

## 2019-01-12 RX ORDER — KETAMINE HYDROCHLORIDE 10 MG/ML
INJECTION, SOLUTION INTRAMUSCULAR; INTRAVENOUS AS NEEDED
Status: DISCONTINUED | OUTPATIENT
Start: 2019-01-12 | End: 2019-01-12 | Stop reason: HOSPADM

## 2019-01-12 RX ORDER — SODIUM CHLORIDE 0.9 % (FLUSH) 0.9 %
5-40 SYRINGE (ML) INJECTION EVERY 8 HOURS
Status: DISCONTINUED | OUTPATIENT
Start: 2019-01-12 | End: 2019-01-12 | Stop reason: HOSPADM

## 2019-01-12 RX ORDER — FENTANYL CITRATE 50 UG/ML
50 INJECTION, SOLUTION INTRAMUSCULAR; INTRAVENOUS AS NEEDED
Status: DISCONTINUED | OUTPATIENT
Start: 2019-01-12 | End: 2019-01-12 | Stop reason: HOSPADM

## 2019-01-12 RX ORDER — SODIUM CHLORIDE, SODIUM LACTATE, POTASSIUM CHLORIDE, CALCIUM CHLORIDE 600; 310; 30; 20 MG/100ML; MG/100ML; MG/100ML; MG/100ML
INJECTION, SOLUTION INTRAVENOUS
Status: DISCONTINUED | OUTPATIENT
Start: 2019-01-12 | End: 2019-01-12 | Stop reason: HOSPADM

## 2019-01-12 RX ORDER — SODIUM CHLORIDE 0.9 % (FLUSH) 0.9 %
5-40 SYRINGE (ML) INJECTION AS NEEDED
Status: DISCONTINUED | OUTPATIENT
Start: 2019-01-12 | End: 2019-01-12 | Stop reason: HOSPADM

## 2019-01-12 RX ORDER — SODIUM CHLORIDE, SODIUM LACTATE, POTASSIUM CHLORIDE, CALCIUM CHLORIDE 600; 310; 30; 20 MG/100ML; MG/100ML; MG/100ML; MG/100ML
75 INJECTION, SOLUTION INTRAVENOUS CONTINUOUS
Status: DISCONTINUED | OUTPATIENT
Start: 2019-01-12 | End: 2019-01-12 | Stop reason: HOSPADM

## 2019-01-12 RX ORDER — SUCCINYLCHOLINE CHLORIDE 20 MG/ML
INJECTION INTRAMUSCULAR; INTRAVENOUS AS NEEDED
Status: DISCONTINUED | OUTPATIENT
Start: 2019-01-12 | End: 2019-01-12 | Stop reason: HOSPADM

## 2019-01-12 RX ORDER — CEFAZOLIN SODIUM/WATER 2 G/20 ML
SYRINGE (ML) INTRAVENOUS
Status: COMPLETED
Start: 2019-01-12 | End: 2019-01-12

## 2019-01-12 RX ORDER — FENTANYL CITRATE 50 UG/ML
INJECTION, SOLUTION INTRAMUSCULAR; INTRAVENOUS AS NEEDED
Status: DISCONTINUED | OUTPATIENT
Start: 2019-01-12 | End: 2019-01-12 | Stop reason: HOSPADM

## 2019-01-12 RX ORDER — ACETAMINOPHEN 10 MG/ML
INJECTION, SOLUTION INTRAVENOUS AS NEEDED
Status: DISCONTINUED | OUTPATIENT
Start: 2019-01-12 | End: 2019-01-12 | Stop reason: HOSPADM

## 2019-01-12 RX ORDER — MORPHINE SULFATE 4 MG/ML
2 INJECTION, SOLUTION INTRAMUSCULAR; INTRAVENOUS
Status: DISCONTINUED | OUTPATIENT
Start: 2019-01-12 | End: 2019-01-12 | Stop reason: HOSPADM

## 2019-01-12 RX ORDER — ALBUMIN HUMAN 50 G/1000ML
SOLUTION INTRAVENOUS AS NEEDED
Status: DISCONTINUED | OUTPATIENT
Start: 2019-01-12 | End: 2019-01-12 | Stop reason: HOSPADM

## 2019-01-12 RX ORDER — GLYCOPYRROLATE 0.2 MG/ML
INJECTION INTRAMUSCULAR; INTRAVENOUS AS NEEDED
Status: DISCONTINUED | OUTPATIENT
Start: 2019-01-12 | End: 2019-01-12 | Stop reason: HOSPADM

## 2019-01-12 RX ORDER — ROPIVACAINE HYDROCHLORIDE 5 MG/ML
INJECTION, SOLUTION EPIDURAL; INFILTRATION; PERINEURAL AS NEEDED
Status: DISCONTINUED | OUTPATIENT
Start: 2019-01-12 | End: 2019-01-12 | Stop reason: HOSPADM

## 2019-01-12 RX ORDER — SODIUM CHLORIDE 9 MG/ML
50 INJECTION, SOLUTION INTRAVENOUS CONTINUOUS
Status: DISCONTINUED | OUTPATIENT
Start: 2019-01-12 | End: 2019-01-12 | Stop reason: HOSPADM

## 2019-01-12 RX ORDER — NEOSTIGMINE METHYLSULFATE 1 MG/ML
INJECTION INTRAVENOUS AS NEEDED
Status: DISCONTINUED | OUTPATIENT
Start: 2019-01-12 | End: 2019-01-12 | Stop reason: HOSPADM

## 2019-01-12 RX ORDER — ONDANSETRON 2 MG/ML
INJECTION INTRAMUSCULAR; INTRAVENOUS AS NEEDED
Status: DISCONTINUED | OUTPATIENT
Start: 2019-01-12 | End: 2019-01-12 | Stop reason: HOSPADM

## 2019-01-12 RX ORDER — CEFAZOLIN SODIUM/WATER 2 G/20 ML
2 SYRINGE (ML) INTRAVENOUS ONCE
Status: COMPLETED | OUTPATIENT
Start: 2019-01-12 | End: 2019-01-12

## 2019-01-12 RX ORDER — MIDAZOLAM HYDROCHLORIDE 1 MG/ML
1 INJECTION, SOLUTION INTRAMUSCULAR; INTRAVENOUS AS NEEDED
Status: DISCONTINUED | OUTPATIENT
Start: 2019-01-12 | End: 2019-01-12 | Stop reason: HOSPADM

## 2019-01-12 RX ORDER — ONDANSETRON 2 MG/ML
4 INJECTION INTRAMUSCULAR; INTRAVENOUS AS NEEDED
Status: DISCONTINUED | OUTPATIENT
Start: 2019-01-12 | End: 2019-01-12 | Stop reason: HOSPADM

## 2019-01-12 RX ORDER — OXYCODONE AND ACETAMINOPHEN 5; 325 MG/1; MG/1
1 TABLET ORAL AS NEEDED
Status: DISCONTINUED | OUTPATIENT
Start: 2019-01-12 | End: 2019-01-12 | Stop reason: HOSPADM

## 2019-01-12 RX ORDER — LIDOCAINE HYDROCHLORIDE 10 MG/ML
0.1 INJECTION, SOLUTION EPIDURAL; INFILTRATION; INTRACAUDAL; PERINEURAL AS NEEDED
Status: DISCONTINUED | OUTPATIENT
Start: 2019-01-12 | End: 2019-01-12 | Stop reason: HOSPADM

## 2019-01-12 RX ORDER — PROPOFOL 10 MG/ML
INJECTION, EMULSION INTRAVENOUS AS NEEDED
Status: DISCONTINUED | OUTPATIENT
Start: 2019-01-12 | End: 2019-01-12 | Stop reason: HOSPADM

## 2019-01-12 RX ADMIN — SUCCINYLCHOLINE CHLORIDE 160 MG: 20 INJECTION INTRAMUSCULAR; INTRAVENOUS at 08:35

## 2019-01-12 RX ADMIN — CYCLOBENZAPRINE HYDROCHLORIDE 5 MG: 10 TABLET, FILM COATED ORAL at 23:53

## 2019-01-12 RX ADMIN — ROCURONIUM BROMIDE 10 MG: 10 INJECTION, SOLUTION INTRAVENOUS at 08:35

## 2019-01-12 RX ADMIN — MIDAZOLAM HYDROCHLORIDE 2 MG: 1 INJECTION, SOLUTION INTRAMUSCULAR; INTRAVENOUS at 08:25

## 2019-01-12 RX ADMIN — HYDROMORPHONE HYDROCHLORIDE 0.5 MG: 2 INJECTION, SOLUTION INTRAMUSCULAR; INTRAVENOUS; SUBCUTANEOUS at 08:25

## 2019-01-12 RX ADMIN — SODIUM CHLORIDE, SODIUM LACTATE, POTASSIUM CHLORIDE, CALCIUM CHLORIDE: 600; 310; 30; 20 INJECTION, SOLUTION INTRAVENOUS at 10:24

## 2019-01-12 RX ADMIN — SODIUM CHLORIDE, SODIUM LACTATE, POTASSIUM CHLORIDE, CALCIUM CHLORIDE: 600; 310; 30; 20 INJECTION, SOLUTION INTRAVENOUS at 08:25

## 2019-01-12 RX ADMIN — SODIUM CHLORIDE 125 ML/HR: 900 INJECTION, SOLUTION INTRAVENOUS at 12:08

## 2019-01-12 RX ADMIN — ROCURONIUM BROMIDE 30 MG: 10 INJECTION, SOLUTION INTRAVENOUS at 08:41

## 2019-01-12 RX ADMIN — PROPOFOL 160 MG: 10 INJECTION, EMULSION INTRAVENOUS at 08:35

## 2019-01-12 RX ADMIN — ACETAMINOPHEN 1000 MG: 10 INJECTION, SOLUTION INTRAVENOUS at 09:10

## 2019-01-12 RX ADMIN — Medication 2 G: at 08:50

## 2019-01-12 RX ADMIN — OXYCODONE HYDROCHLORIDE 5 MG: 5 TABLET ORAL at 00:00

## 2019-01-12 RX ADMIN — METOPROLOL SUCCINATE 12.5 MG: 25 TABLET, EXTENDED RELEASE ORAL at 08:06

## 2019-01-12 RX ADMIN — OXYCODONE HYDROCHLORIDE 5 MG: 5 TABLET ORAL at 23:52

## 2019-01-12 RX ADMIN — ROCURONIUM BROMIDE 10 MG: 10 INJECTION, SOLUTION INTRAVENOUS at 09:33

## 2019-01-12 RX ADMIN — OXYCODONE HYDROCHLORIDE 5 MG: 5 TABLET ORAL at 19:10

## 2019-01-12 RX ADMIN — HYDROMORPHONE HYDROCHLORIDE 0.5 MG: 2 INJECTION, SOLUTION INTRAMUSCULAR; INTRAVENOUS; SUBCUTANEOUS at 10:46

## 2019-01-12 RX ADMIN — FENTANYL CITRATE 50 MCG: 50 INJECTION, SOLUTION INTRAMUSCULAR; INTRAVENOUS at 09:33

## 2019-01-12 RX ADMIN — FENTANYL CITRATE 25 MCG: 50 INJECTION, SOLUTION INTRAMUSCULAR; INTRAVENOUS at 22:17

## 2019-01-12 RX ADMIN — FENTANYL CITRATE 50 MCG: 50 INJECTION, SOLUTION INTRAMUSCULAR; INTRAVENOUS at 08:35

## 2019-01-12 RX ADMIN — KETAMINE HYDROCHLORIDE 30 MG: 10 INJECTION, SOLUTION INTRAMUSCULAR; INTRAVENOUS at 08:35

## 2019-01-12 RX ADMIN — ONDANSETRON 4 MG: 2 INJECTION INTRAMUSCULAR; INTRAVENOUS at 10:25

## 2019-01-12 RX ADMIN — GLYCOPYRROLATE 0.6 MG: 0.2 INJECTION INTRAMUSCULAR; INTRAVENOUS at 10:24

## 2019-01-12 RX ADMIN — NEOSTIGMINE METHYLSULFATE 3 MG: 1 INJECTION INTRAVENOUS at 10:24

## 2019-01-12 RX ADMIN — OXYCODONE HYDROCHLORIDE 5 MG: 5 TABLET ORAL at 02:50

## 2019-01-12 RX ADMIN — ALBUMIN HUMAN 250 ML: 50 SOLUTION INTRAVENOUS at 08:45

## 2019-01-12 RX ADMIN — Medication 10 ML: at 23:53

## 2019-01-12 NOTE — ED NOTES
7:45 PM 
Xray shows right femur fracture. Patient admitted to Dr. Jojo Kiran with Orthopedic consult pending. EKG @ 1944 NSR, 77bpm, no ST changes, no ectopy, left axis deviation Patient is being admitted to the hospital.  The results of their tests and reasons for their admission have been discussed with them and/or available family. They convey agreement and understanding for the need to be admitted and for their admission diagnosis.

## 2019-01-12 NOTE — OP NOTES
[address] [place] [address] MYA Lockett  MR#: [id number]  : 1945  ACCOUNT #: [de-identified]   DATE OF SERVICE: 2019    SURGEON:  Luz Viera MD    FIRST ASSISTANT:  Rober Wood SA     PREOPERATIVE DIAGNOSIS:  Displaced intertrochanteric fracture of the right femur. POSTOPERATIVE DIAGNOSIS:  Displaced intertrochanteric fracture of the right femur. PROCEDURE PERFORMED:  Intramedullary fixation of right intertrochanteric femur fracture. COMPONENTS IMPLANTED:  Synthes long trochanteric femoral nail. ANESTHESIA:  General.    COMPLICATIONS:  None. ESTIMATED BLOOD LOSS:  250 mL. SPECIMENS REMOVED:  None. INDICATIONS:  The patient is a 43-year-old female who suffered severe right hip pain after a mechanical ground level fall yesterday. Radiographs of the right femur demonstrate displaced comminuted intertrochanteric fracture of the femur. She presents to the operating room for surgical fixation to maximize functional recovery. Risks, benefits and alternatives of procedure were reviewed with her in detail and she desires to proceed. PROCEDURE IN DETAIL:  The patient was taken to the operating room where general anesthesia was induced on her hospital bed. She was transferred to supine position on the fracture table. Left lower extremity was placed in boot traction with the hip slightly extended and abducted to allow for shoot through lateral images of the right hip intraoperatively. Reduction maneuver was performed on the right hip and right lower extremity was also placed in traction. AP and lateral fluoroscopic images confirmed fracture reduction. Right hip and thigh were prepped and draped in the usual sterile fashion. Through a short longitudinal incision proximal to greater trochanter, dissected sharply through deep fascia.   A guidewire was inserted through the tip of the greater trochanter in line with the intramedullary canal of the femur under fluoroscopic guidance on both AP and lateral images. The starter reamer was passed to the level of the lesser trochanter. A long ball-tipped guidewire was passed down the femoral canal to the distal physeal femoral scar, measured for a 340 mm length nail. Canal was reamed up to 11.5 mm for insertion of a 10 mm diameter nail. The nail was passed to appropriate level under fluoroscopy to allow for center pin placement on the AP image. Guidewire was placed percutaneously using 130 degree jig. Lateral image confirmed appropriate placement as well. We measured and prepared for a 95 mm spiral blade. Blade was inserted. Compression was applied and set screw was tightened in static position due to the comminuted nature of the fracture. Two distal locking screws were placed under fluoroscopic guidance in percutaneous fashion. Final AP and lateral fluoroscopic images confirmed fracture reduction and placement of all hardware. Wounds were irrigated. Deep fascia was closed proximally with heavy Vicryl sutures. All incisions were then closed with 2-0 subcutaneous Vicryl and running Monocryl subcuticular sutures. Aquacel occlusive dressings were applied. The patient was awakened, extubated, and transported to the postanesthesia care unit in stable condition. All counts were correct at the end of the procedure.       MD Silverio Gorman / MAKENNA  D: 01/12/2019 12:05     T: 01/12/2019 14:58  JOB #: [id number]  CC: Ana Cristina Sheikh MD  CC: Lian Montano MD

## 2019-01-12 NOTE — PROGRESS NOTES
TRANSFER - IN REPORT: 
 
Verbal report received from PENG Whitley(name) on [employer]  being received from [email or web address]) for routine post - op Report consisted of patients Situation, Background, Assessment and  
Recommendations(SBAR). Information from the following report(s) SBAR, Kardex, OR Summary, Intake/Output, MAR and Recent Results was reviewed with the receiving nurse. Opportunity for questions and clarification was provided. Assessment completed upon patients arrival to unit and care assumed.

## 2019-01-12 NOTE — ED NOTES
Pt c/o burning at IV site with potassium running. Checked site for patency, no s/s of infiltration. Slowed infusion.

## 2019-01-12 NOTE — CONSULTS
Harvey Jones is a 68 y.o. female with PMH significant for HTN and skin cancer is seen for right hip and thigh pain following a GLF at her daughters home yesterday. States she was bending over to pet a pig when she lost her balance and landed on her right side. She says she had immediate pain and was unable to stand or bear weight. She was taken to the [place] ER where she was found to have a hip fracture and she was transferred here for further management. She describes pain in her anterior thigh and groin made worse with movement. She denies radiation of pain into her back or knee. She denies other joint pains. She lives at home with her  who has a Parkinson's. Patient remains active and ambulates independently. She has no stairs at home and has a daughter who lives close by.          Patient Active Problem List     Diagnosis Date Noted    Hip fracture ([facility] [address] 01/11/2019            Family History   Problem Relation Age of Onset    Heart Disease Father      Kidney Disease Father        Social History           Tobacco Use    Smoking status: Never Smoker    Smokeless tobacco: Never Used   Substance Use Topics    Alcohol use: No           Past Medical History:   Diagnosis Date    Cancer ([facility] [address]       skin- neck face    High blood pressure      Hypertension      Other ill-defined conditions(799.89)       blood in urine being eval. to this date    Skin cancer              Past Surgical History:   Procedure Laterality Date    HX GYN         lap BTL    HX MOHS PROCEDURES   02/12/2018     BCC posterior base of neck by Dr. Emily Morales               Prior to Admission medications    Medication Sig Start Date End Date Taking? Authorizing Provider   metoprolol succinate (TOPROL-XL) 25 mg XL tablet Take 12.5 mg by mouth daily.     Yes Other, MD Reva   acyclovir (ZOVIRAX) 200 mg capsule Take 200 mg by mouth every four (4) hours (while awake).      Yes Other, MD Reva   aspirin 81 mg chewable tablet Take 81 mg by mouth daily.     Yes Other, MD Reva   atorvastatin (LIPITOR) 20 mg tablet Take  by mouth daily.     Yes Provider, Historical   estrogen, conjugated,-medroxyPROGESTERone (PREMPRO) 0.3-1.5 mg tab Take 1 Tab by mouth daily.     Yes Provider, Historical   potassium chloride (KLOR-CON M20) 20 mEq tablet Take 20 mEq by mouth daily. 11/3/10   Yes Provider, Historical   hydrochlorothiazide (HYDRODIURIL) 25 mg tablet Take 25 mg by mouth daily.     Yes Provider, Historical   pantoprazole (PROTONIX) 40 mg tablet Take 40 mg by mouth daily.       Provider, Historical   estradiol-norethindrone (MIMVEY) 1-0.5 mg per tablet Take 1 Tab by mouth daily.  11/3/10     Provider, Historical             Current Facility-Administered Medications   Medication Dose Route Frequency    sodium chloride (NS) flush 5-40 mL  5-40 mL IntraVENous Q8H    sodium chloride (NS) flush 5-40 mL  5-40 mL IntraVENous PRN    naloxone (NARCAN) injection 0.4 mg  0.4 mg IntraVENous PRN    [START ON 1/12/2019] atorvastatin (LIPITOR) tablet 20 mg  20 mg Oral DAILY    [START ON 1/12/2019] metoprolol succinate (TOPROL-XL) XL tablet 12.5 mg  12.5 mg Oral DAILY    [START ON 1/12/2019] pantoprazole (PROTONIX) tablet 40 mg  40 mg Oral DAILY    fentaNYL citrate (PF) injection 25 mcg  25 mcg IntraVENous Q4H PRN    fentaNYL citrate (PF) injection 50 mcg  50 mcg IntraVENous Q4H PRN    potassium chloride (KLOR-CON) packet for solution 40 mEq  40 mEq Oral BID WITH MEALS      No Known Allergies      Review of Systems:  Pertinent items are noted in HPI.     Mental Status: no dementia     Objective:      Visit Vitals  /69   Pulse 87   Temp 97.9 °F (36.6 °C)   Resp 21   Wt 75.2 kg (165 lb 12.6 oz)   SpO2 95%   BMI 29.37 kg/m²          EXAM: Awake and alert lying in bed; NAD  Full ROM and No TTP upper extremities and LLE  Right leg is externally rotated and held in a position of slight flexion - appears somewhat shortened when compared to left  Log roll of leg increases pain  Distal sensory function grossly intact  5/5 strength for ankle plantar and dorsiflexion  Distal pulses palpable     Imaging Review: EXAM: XR FEMUR RT 2 VS, XR HIP RT W OR WO PELV 2-3 VWS     INDICATION: eval for fx. Fall. Hip pain.     COMPARISON: None.     FINDINGS: Two views of the right femur. AP pelvis and right hip frog leg. There  is an acute intertrochanteric fracture of the right femur. The femoral shaft is  retracted by approximately 1.3 cm. The lesser trochanter is split and displaced  2.7 cm superior medially. No dislocation. Moderate degenerative changes are seen  in the pubic symphysis.     IMPRESSION  IMPRESSION: Acute mildly displaced intertrochanteric fracture of the proximal  right femur. .     Labs:   Recent Results          Recent Results (from the past 24 hour(s))   PROTHROMBIN TIME + INR     Collection Time: 01/11/19  6:00 PM   Result Value Ref Range     INR 1.0 0.9 - 1.1       Prothrombin time 9.8 9.0 - 11.1 sec   PTT     Collection Time: 01/11/19  6:00 PM   Result Value Ref Range     aPTT 21.7 (L) 22.1 - 32.0 sec     aPTT, therapeutic range     58.0 - 77.0 SECS   CREATININE     Collection Time: 01/11/19  6:00 PM   Result Value Ref Range     Creatinine 1.06 (H) 0.55 - 1.02 MG/DL     GFR est AA >60 >60 ml/min/1.73m2     GFR est non-AA 51 (L) >60 ml/min/1.73m2   MAGNESIUM     Collection Time: 01/11/19  6:00 PM   Result Value Ref Range     Magnesium 2.0 1.6 - 2.4 mg/dL   CBC WITH AUTOMATED DIFF     Collection Time: 01/11/19  6:04 PM   Result Value Ref Range     WBC 15.9 (H) 3.6 - 11.0 K/uL     RBC 4.40 3.80 - 5.20 M/uL     HGB 12.3 11.5 - 16.0 g/dL     HCT 37.9 35.0 - 47.0 %     MCV 86.1 80.0 - 99.0 FL     MCH 28.0 26.0 - 34.0 PG     MCHC 32.5 30.0 - 36.5 g/dL     RDW 13.6 11.5 - 14.5 %     PLATELET 239 916 - 026 K/uL     MPV 10.5 8.9 - 12.9 FL     NRBC 0.0 0  WBC     ABSOLUTE NRBC 0.00 0.00 - 0.01 K/uL     NEUTROPHILS 89 (H) 32 - 75 %     LYMPHOCYTES 5 (L) 12 - 49 %     MONOCYTES 5 5 - 13 %     EOSINOPHILS 0 0 - 7 %     BASOPHILS 0 0 - 1 %     IMMATURE GRANULOCYTES 1 (H) 0.0 - 0.5 %     ABS. NEUTROPHILS 14.1 (H) 1.8 - 8.0 K/UL     ABS. LYMPHOCYTES 0.8 0.8 - 3.5 K/UL     ABS. MONOCYTES 0.8 0.0 - 1.0 K/UL     ABS. EOSINOPHILS 0.0 0.0 - 0.4 K/UL     ABS. BASOPHILS 0.0 0.0 - 0.1 K/UL     ABS. IMM. GRANS. 0.2 (H) 0.00 - 0.04 K/UL     DF AUTOMATED       RBC COMMENTS NORMOCYTIC, NORMOCHROMIC     METABOLIC PANEL, COMPREHENSIVE     Collection Time: 01/11/19  6:04 PM   Result Value Ref Range     Sodium 140 136 - 145 mmol/L     Potassium 2.9 (L) 3.5 - 5.1 mmol/L     Chloride 100 97 - 108 mmol/L     CO2 26 21 - 32 mmol/L     Anion gap 14 5 - 15 mmol/L     Glucose 136 (H) 65 - 100 mg/dL     BUN 13 6 - 20 MG/DL     Creatinine 1.09 (H) 0.55 - 1.02 MG/DL     BUN/Creatinine ratio 12 12 - 20       GFR est AA 60 (L) >60 ml/min/1.73m2     GFR est non-AA 49 (L) >60 ml/min/1.73m2     Calcium 9.6 8.5 - 10.1 MG/DL     Bilirubin, total 0.6 0.2 - 1.0 MG/DL     ALT (SGPT) 22 12 - 78 U/L     AST (SGOT) 23 15 - 37 U/L     Alk.  phosphatase 74 45 - 117 U/L     Protein, total 7.1 6.4 - 8.2 g/dL     Albumin 3.6 3.5 - 5.0 g/dL     Globulin 3.5 2.0 - 4.0 g/dL     A-G Ratio 1.0 (L) 1.1 - 2.2     URINALYSIS W/MICROSCOPIC     Collection Time: 01/11/19  7:00 PM   Result Value Ref Range     Color YELLOW/STRAW       Appearance CLEAR CLEAR       Specific gravity 1.025 1.003 - 1.030       pH (UA) 6.0 5.0 - 8.0       Protein NEGATIVE  NEG mg/dL     Glucose NEGATIVE  NEG mg/dL     Ketone 15 (A) NEG mg/dL     Bilirubin NEGATIVE  NEG       Blood NEGATIVE  NEG       Urobilinogen 0.2 0.2 - 1.0 EU/dL     Nitrites NEGATIVE  NEG       Leukocyte Esterase NEGATIVE  NEG       WBC 0-4 0 - 4 /hpf     RBC 0-5 0 - 5 /hpf     Epithelial cells MODERATE (A) FEW /lpf     Bacteria NEGATIVE  NEG /hpf   URINE CULTURE HOLD SAMPLE     Collection Time: 01/11/19  7:00 PM   Result Value Ref Range     Urine culture hold           URINE ON HOLD IN MICROBIOLOGY DEPT FOR 3 DAYS. IF UNPRESERVED URINE IS SUBMITTED, IT CANNOT BE USED FOR ADDITIONAL TESTING AFTER 24 HRS, RECOLLECTION WILL BE REQUIRED.          Impression:      Right intertrochanteric femur fx           Plan:      Acute right intertrochanteric hip fracture - Recommend surgical fixation to maximize functional recovery.  Discussed risks, benefits, alternatives with pt and family in detail and they desire to proceed.  Anatoly Resendiz MD

## 2019-01-12 NOTE — H&P
History & Physical 
 
Primary Care Provider: Carly Toro MD 
Source of Information: Patient History of Presenting Illness:  
Hang Fry is a 68 y.o. female who presents with fall hip pain  
pmh of htn, hlp, patient was visiting her daughter and going to see daughter's pig, the pig began to move around and she stepped down a step to miss him, and fell. Pt denies hitting head with fall. States she was unable to get herself up . Pt complains of right hip pain. 5/10 Pertinent negatives include no numbness, no stiffness, no tingling, no itching, no back pain and no neck pain. The symptoms are aggravated by contact, movement and palpation. She has tried nothing for the symptoms. The treatment provided no relief. There has been a history of trauma. No cp, no dyspnea, no sob. Baseline line function well. Review of Systems: 
General: no fever,no changes of weight and sleep pattern HEENT: no headache, no vision changes, no nose discharge, no hearing changes RES: no wheezing, no cough, no sob CVS: no cp, no palpitation. Muscular: hpi Skin: no rash, no itching GI: no vomiting, no diarrhea : no dysuria, no hematuria Hemo: no gum bleeding, no petechial  
Neuro: no sensation changes, no focal weakness Endo: no polydipsia Psych: denied depression Past Medical History:  
Diagnosis Date  Cancer ([facility] [address] skin- neck face  High blood pressure  Hypertension  Other ill-defined conditions(799.89)   
 blood in urine being eval. to this date  Skin cancer Past Surgical History:  
Procedure Laterality Date  HX GYN    
 lap BTL  HX MOHS PROCEDURES  02/12/2018 BCC posterior base of neck by Dr. Susan Valdez Prior to Admission medications Medication Sig Start Date End Date Taking? Authorizing Provider  
metoprolol succinate (TOPROL-XL) 25 mg XL tablet Take 12.5 mg by mouth daily.    Yes Other, MD Reva  
 acyclovir (ZOVIRAX) 200 mg capsule Take 200 mg by mouth every four (4) hours (while awake). Yes Other, MD Reva  
aspirin 81 mg chewable tablet Take 81 mg by mouth daily. Yes Other, MD Reva  
atorvastatin (LIPITOR) 20 mg tablet Take  by mouth daily. Yes Provider, Historical  
estrogen, conjugated,-medroxyPROGESTERone (PREMPRO) 0.3-1.5 mg tab Take 1 Tab by mouth daily. Yes Provider, Historical  
potassium chloride (KLOR-CON M20) 20 mEq tablet Take 20 mEq by mouth daily. 11/3/10  Yes Provider, Historical  
hydrochlorothiazide (HYDRODIURIL) 25 mg tablet Take 25 mg by mouth daily. Yes Provider, Historical  
pantoprazole (PROTONIX) 40 mg tablet Take 40 mg by mouth daily. Provider, Historical  
estradiol-norethindrone (MIMVEY) 1-0.5 mg per tablet Take 1 Tab by mouth daily. 11/3/10   Provider, Historical  
 
No Known Allergies Family History Problem Relation Age of Onset  Heart Disease Father  Kidney Disease Father SOCIAL HISTORY: 
Patient resides: 
Independently x [facility]isted [facility] With family care Smoking history:  
None x Former Chronic Alcohol history:  
None x Social   
Chronic Ambulates:  
Independently x  
w/cane   
w/walker   
w/wc CODE STATUS: 
DNR Full x Other Objective:  
 
Physical Exam:  
 
Visit Vitals /75 (BP 1 Location: Left arm, BP Patient Position: At rest) Pulse 72 Temp 98.5 °F (36.9 °C) Resp 16 Wt 75.2 kg (165 lb 12.6 oz) SpO2 98% BMI 29.37 kg/m² O2 Device: Room air General:  Alert, cooperative, no distress, appears stated age. Head:  Normocephalic, without obvious abnormality, atraumatic. Eyes:  Conjunctivae/corneas clear. PERRL, EOMs intact. Nose: Nares normal. Septum midline. Mucosa normal. No drainage or sinus tenderness.   
Throat: Lips, mucosa, and tongue normal. Teeth and gums normal.  
Neck: Supple, symmetrical, trachea midline, no adenopathy, thyroid: no enlargement/tenderness/nodules, no carotid bruit and no JVD. Back:   Symmetric, no curvature. ROM normal. No CVA tenderness. Lungs:   Clear to auscultation bilaterally. Chest wall:  No tenderness or deformity. Heart:  Regular rate and rhythm, S1, S2 normal, no murmur, click, rub or gallop. Abdomen:   Soft, non-tender. Bowel sounds normal. No masses,  No organomegaly. Extremities: Rt leg rotated, mild shorten, no edema Pulses: 2+ and symmetric all extremities. Skin: Skin color, texture, turgor normal. No rashes or lesions Neurologic: CNII-XII intact. Data Review:  
 
Recent Days: 
Recent Labs  
  01/11/19 
1804 WBC 15.9* HGB 12.3 HCT 37.9  Recent Labs  
  01/11/19 
1804 01/11/19 
1800   --   
K 2.9*  --   
  --   
CO2 26  --   
*  --   
BUN 13  --   
CREA 1.09* 1.06* CA 9.6  --   
MG  --  2.0 ALB 3.6  --   
SGOT 23  --   
ALT 22  --   
INR  --  1.0 No results for input(s): PH, PCO2, PO2, HCO3, FIO2 in the last 72 hours. 24 Hour Results: 
Recent Results (from the past 24 hour(s)) PROTHROMBIN TIME + INR Collection Time: 01/11/19  6:00 PM  
Result Value Ref Range INR 1.0 0.9 - 1.1 Prothrombin time 9.8 9.0 - 11.1 sec PTT Collection Time: 01/11/19  6:00 PM  
Result Value Ref Range aPTT 21.7 (L) 22.1 - 32.0 sec  
 aPTT, therapeutic range     58.0 - 77.0 SECS  
CREATININE Collection Time: 01/11/19  6:00 PM  
Result Value Ref Range Creatinine 1.06 (H) 0.55 - 1.02 MG/DL  
 GFR est AA >60 >60 ml/min/1.73m2 GFR est non-AA 51 (L) >60 ml/min/1.73m2 MAGNESIUM Collection Time: 01/11/19  6:00 PM  
Result Value Ref Range Magnesium 2.0 1.6 - 2.4 mg/dL CBC WITH AUTOMATED DIFF Collection Time: 01/11/19  6:04 PM  
Result Value Ref Range WBC 15.9 (H) 3.6 - 11.0 K/uL  
 RBC 4.40 3.80 - 5.20 M/uL  
 HGB 12.3 11.5 - 16.0 g/dL HCT 37.9 35.0 - 47.0 %  MCV 86.1 80.0 - 99.0 FL  
 MCH 28.0 26.0 - 34.0 PG  
 MCHC 32.5 30.0 - 36.5 g/dL  
 RDW 13.6 11.5 - 14.5 % PLATELET 696 612 - 642 K/uL MPV 10.5 8.9 - 12.9 FL  
 NRBC 0.0 0  WBC ABSOLUTE NRBC 0.00 0.00 - 0.01 K/uL NEUTROPHILS 89 (H) 32 - 75 % LYMPHOCYTES 5 (L) 12 - 49 % MONOCYTES 5 5 - 13 % EOSINOPHILS 0 0 - 7 % BASOPHILS 0 0 - 1 % IMMATURE GRANULOCYTES 1 (H) 0.0 - 0.5 % ABS. NEUTROPHILS 14.1 (H) 1.8 - 8.0 K/UL  
 ABS. LYMPHOCYTES 0.8 0.8 - 3.5 K/UL  
 ABS. MONOCYTES 0.8 0.0 - 1.0 K/UL  
 ABS. EOSINOPHILS 0.0 0.0 - 0.4 K/UL  
 ABS. BASOPHILS 0.0 0.0 - 0.1 K/UL  
 ABS. IMM. GRANS. 0.2 (H) 0.00 - 0.04 K/UL  
 DF AUTOMATED    
 RBC COMMENTS NORMOCYTIC, NORMOCHROMIC METABOLIC PANEL, COMPREHENSIVE Collection Time: 01/11/19  6:04 PM  
Result Value Ref Range Sodium 140 136 - 145 mmol/L Potassium 2.9 (L) 3.5 - 5.1 mmol/L Chloride 100 97 - 108 mmol/L  
 CO2 26 21 - 32 mmol/L Anion gap 14 5 - 15 mmol/L Glucose 136 (H) 65 - 100 mg/dL BUN 13 6 - 20 MG/DL Creatinine 1.09 (H) 0.55 - 1.02 MG/DL  
 BUN/Creatinine ratio 12 12 - 20 GFR est AA 60 (L) >60 ml/min/1.73m2 GFR est non-AA 49 (L) >60 ml/min/1.73m2 Calcium 9.6 8.5 - 10.1 MG/DL Bilirubin, total 0.6 0.2 - 1.0 MG/DL  
 ALT (SGPT) 22 12 - 78 U/L  
 AST (SGOT) 23 15 - 37 U/L Alk. phosphatase 74 45 - 117 U/L Protein, total 7.1 6.4 - 8.2 g/dL Albumin 3.6 3.5 - 5.0 g/dL Globulin 3.5 2.0 - 4.0 g/dL A-G Ratio 1.0 (L) 1.1 - 2.2 URINALYSIS W/MICROSCOPIC Collection Time: 01/11/19  7:00 PM  
Result Value Ref Range Color YELLOW/STRAW Appearance CLEAR CLEAR Specific gravity 1.025 1.003 - 1.030    
 pH (UA) 6.0 5.0 - 8.0 Protein NEGATIVE  NEG mg/dL Glucose NEGATIVE  NEG mg/dL Ketone 15 (A) NEG mg/dL Bilirubin NEGATIVE  NEG Blood NEGATIVE  NEG Urobilinogen 0.2 0.2 - 1.0 EU/dL Nitrites NEGATIVE  NEG  Leukocyte Esterase NEGATIVE  NEG    
 WBC 0-4 0 - 4 /hpf  
 RBC 0-5 0 - 5 /hpf  
 Epithelial cells MODERATE (A) FEW /lpf Bacteria NEGATIVE  NEG /hpf URINE CULTURE HOLD SAMPLE Collection Time: 01/11/19  7:00 PM  
Result Value Ref Range Urine culture hold URINE ON HOLD IN MICROBIOLOGY DEPT FOR 3 DAYS. IF UNPRESERVED URINE IS SUBMITTED, IT CANNOT BE USED FOR ADDITIONAL TESTING AFTER 24 HRS, RECOLLECTION WILL BE REQUIRED. Imaging:  Acute mildly displaced intertrochanteric fracture of the proximal 
right femur. Gae Notch Assessment:  
 
Active Problems: 
  Hip fracture ([address] (1/11/2019) Plan: 1. Right hip fx: OR in am. Npo after midnight, pain control with IV fentanyl if severe pain 2. Hypokalemia: due to HCTZ, po and iv kcl, follow cmp 3. HTN: hold HCTZ, can continue metoprolol. 4. Leukocytosis: no fever, UA negative, CXR negative, could be stress related, follow cbc.  
5. Pre-op: will need 12 lead EKG as baseline Signed By: Kimi Barreto MD   
 January 11, 2019

## 2019-01-12 NOTE — PROGRESS NOTES
Bedside shift change report given to Suze (oncoming nurse) by Hugo Palumbo (offgoing nurse). Report included the following information SBAR, Kardex, Intake/Output, MAR and Recent Results.

## 2019-01-12 NOTE — PROGRESS NOTES
Hospitalist Progress Note Dana Connolly MD 
Answering service: [phone] OR [phone] from in house phone Date of Service:  2019 NAME:  Harvey Jones :  1945 MRN:  [id number] Admission Summary:  
68 y.o F with PMH of HTN,HLD came to the hospital because of a fall. She was found to have rt hip fracture,admitted for further management. Interval history / Subjective:  
 Patient complaints of rt hip pain. Otherwise,denied any complaints. Assessment & Plan: #Right hip fracture: 
-X ray -Acute mildly displaced intertrochanteric fracture of the proximal 
right femur. . 
-ortho following. 
-surgery today 
-Pain control- IV fentanyl prn #HTN: 
-will continue metoprolol/ Hold hctz in the alla Op setting #Hypokalemia: replete. #CKD stage 2: monitor creatinine #Leukocytosis:resolved -stress related Code status: full DVT prophylaxis: SCD Care Plan discussed with: Patient/Family and Nurse Disposition: TBD Hospital Problems  Date Reviewed: 2018 Codes Class Noted POA * (Principal) Hip fracture ([facility] [address] ICD-10-CM: X29.810Z ICD-9-CM: 820.8  2019 Yes Review of Systems: As per HPI Vital Signs:  
 Last 24hrs VS reviewed since prior progress note. Most recent are: 
Visit Vitals /68 Pulse 72 Temp 98.9 °F (37.2 °C) Resp 16 Wt 75.2 kg (165 lb 12.6 oz) SpO2 97% BMI 29.37 kg/m² No intake or output data in the 24 hours ending 19 0656 Physical Examination:  
 
 
     
Constitutional:  No acute distress, cooperative, pleasant   
ENT:  Oral mucous moist, oropharynx benign. Neck supple, Resp:  CTA bilaterally. No wheezing/rhonchi/rales. No accessory muscle use CV:  Regular rhythm, normal rate, no murmurs, gallops, rubs GI:  Soft, non distended, non tender. normoactive bowel sounds, no hepatosplenomegaly Musculoskeletal:  LLE is externally rotated,has rt hip joint tenderness. Neurologic:  Moves all extremities. AAOx3, CN II-XII reviewed Data Review:  
 Review and/or order of clinical lab test 
Review and/or order of tests in the radiology section of CPT Review and/or order of tests in the medicine section of CPT Labs:  
 
Recent Labs  
  01/12/19 
0300 01/11/19 
1804 WBC 8.0 15.9* HGB 11.2* 12.3 HCT 34.9* 37.9  194 Recent Labs  
  01/12/19 
0300 01/11/19 
1804 01/11/19 
1800 * 140  --   
K 3.7 2.9*  --   
 100  --   
CO2 23 26  --   
BUN 13 13  --   
CREA 1.07* 1.09* 1.06* * 136*  --   
CA 8.9 9.6  --   
MG 2.1  --  2.0 PHOS 3.2  --   --   
 
Recent Labs  
  01/12/19 
0300 01/11/19 
1804 SGOT 27 23 ALT 22 22 AP 66 74 TBILI 0.7 0.6 TP 6.9 7.1 ALB 3.2* 3.6 GLOB 3.7 3.5 Recent Labs  
  01/11/19 
1800 INR 1.0 PTP 9.8 APTT 21.7* No results for input(s): FE, TIBC, PSAT, FERR in the last 72 hours. No results found for: FOL, RBCF No results for input(s): PH, PCO2, PO2 in the last 72 hours. No results for input(s): CPK, CKNDX, TROIQ in the last 72 hours. No lab exists for component: CPKMB No results found for: CHOL, CHOLX, CHLST, CHOLV, HDL, LDL, LDLC, DLDLP, TGLX, TRIGL, TRIGP, CHHD, CHHDX No results found for: Carline Cr Lab Results Component Value Date/Time  Color YELLOW/STRAW 01/11/2019 07:00 PM  
 Appearance CLEAR 01/11/2019 07:00 PM  
 Specific gravity 1.025 01/11/2019 07:00 PM  
 pH (UA) 6.0 01/11/2019 07:00 PM  
 Protein NEGATIVE  01/11/2019 07:00 PM  
 Glucose NEGATIVE  01/11/2019 07:00 PM  
 Ketone 15 (A) 01/11/2019 07:00 PM  
 Bilirubin NEGATIVE  01/11/2019 07:00 PM  
 Urobilinogen 0.2 01/11/2019 07:00 PM  
 Nitrites NEGATIVE  01/11/2019 07:00 PM  
 Leukocyte Esterase NEGATIVE  01/11/2019 07:00 PM  
 Epithelial cells MODERATE (A) 01/11/2019 07:00 PM  
 Bacteria NEGATIVE  01/11/2019 07:00 PM  
 WBC 0-4 01/11/2019 07:00 PM  
 RBC 0-5 01/11/2019 07:00 PM  
 
 
 
Medications Reviewed:  
 
Current Facility-Administered Medications Medication Dose Route Frequency  sodium chloride (NS) flush 5-40 mL  5-40 mL IntraVENous Q8H  
 sodium chloride (NS) flush 5-40 mL  5-40 mL IntraVENous PRN  
 naloxone (NARCAN) injection 0.4 mg  0.4 mg IntraVENous PRN  
 atorvastatin (LIPITOR) tablet 20 mg  20 mg Oral DAILY  metoprolol succinate (TOPROL-XL) XL tablet 12.5 mg  12.5 mg Oral DAILY  pantoprazole (PROTONIX) tablet 40 mg  40 mg Oral DAILY  fentaNYL citrate (PF) injection 25 mcg  25 mcg IntraVENous Q4H PRN  
 fentaNYL citrate (PF) injection 50 mcg  50 mcg IntraVENous Q4H PRN  potassium chloride (KLOR-CON) packet for solution 40 mEq  40 mEq Oral BID WITH MEALS  cyclobenzaprine (FLEXERIL) tablet 5 mg  5 mg Oral TID PRN  
 oxyCODONE IR (ROXICODONE) tablet 5 mg  5 mg Oral Q4H PRN  
 
______________________________________________________________________ EXPECTED LENGTH OF STAY: - - - 
ACTUAL LENGTH OF STAY:          1 Lalito Cooper MD

## 2019-01-12 NOTE — PERIOP NOTES
TRANSFER - OUT REPORT: 
 
Verbal report given to Leona Bustamante  being transferred to [id number] for routine post - op Report consisted of patients Situation, Background, Assessment and  
Recommendations(SBAR). Time Pre op antibiotic given:0850 Anesthesia Stop time: [phone] Bowen Present on Transfer to floor:Y Order for Bowen on Chart:Y Discharge Prescriptions with Chart:N/A Information from the following report(s) SBAR, OR Summary, Procedure Summary, Intake/Output and MAR was reviewed with the receiving nurse. Opportunity for questions and clarification was provided. Is the patient on 02? YES 
     L/Min 2 Other N/A Is the patient on a monitor? NO Is the nurse transporting with the patient? NO Surgical Waiting Area notified of patient's transfer from PACU? YES The following personal items collected during your admission accompanied patient upon transfer:  
Dental Appliance: Dental Appliances: None Vision: Visual Aid: None Hearing Aid:   
Jewelry: Jewelry: None Clothing: Clothing: None Other Valuables:   
Valuables sent to safe:

## 2019-01-12 NOTE — CONSULTS
ORTHOPAEDIC CONSULT NOTE    Subjective:     Date of Consultation:  January 11, 2019  Referring Physician:  Willette Dakin is a 68 y.o. female with PMH significant for HTN and skin cancer is seen for right hip and thigh pain following a GLF at her daughters home today. States she was bending over to pet a pig when she lost her balance and landed on her right side. She says she had immediate pain and was unable to stand or bear weight. She was taken to the [place] ER where she was found to have a hip fracture and she was transferred here for further management. She describes pain in her anterior thigh and groin made worse with movement. She denies radiation of pain into her back or knee. She denies other joint pains. She lives at home with her  who has a Parkinson's. Patient remains active and ambulates independently. She has no stairs at home and has a daughter who lives close by. Patient Active Problem List    Diagnosis Date Noted    Hip fracture ([facility] [address] 01/11/2019     Family History   Problem Relation Age of Onset    Heart Disease Father     Kidney Disease Father       Social History     Tobacco Use    Smoking status: Never Smoker    Smokeless tobacco: Never Used   Substance Use Topics    Alcohol use: No     Past Medical History:   Diagnosis Date    Cancer ([facility] [address]     skin- neck face    High blood pressure     Hypertension     Other ill-defined conditions(799.89)     blood in urine being eval. to this date    Skin cancer       Past Surgical History:   Procedure Laterality Date    HX GYN      lap BTL    HX MOHS PROCEDURES  02/12/2018    BCC posterior base of neck by Dr. Ever Cruz       Prior to Admission medications    Medication Sig Start Date End Date Taking? Authorizing Provider   metoprolol succinate (TOPROL-XL) 25 mg XL tablet Take 12.5 mg by mouth daily. Yes Other, MD Reva   acyclovir (ZOVIRAX) 200 mg capsule Take 200 mg by mouth every four (4) hours (while awake).    Yes Other, MD Reva   aspirin 81 mg chewable tablet Take 81 mg by mouth daily. Yes Other, MD Reva   atorvastatin (LIPITOR) 20 mg tablet Take  by mouth daily. Yes Provider, Historical   estrogen, conjugated,-medroxyPROGESTERone (PREMPRO) 0.3-1.5 mg tab Take 1 Tab by mouth daily. Yes Provider, Historical   potassium chloride (KLOR-CON M20) 20 mEq tablet Take 20 mEq by mouth daily. 11/3/10  Yes Provider, Historical   hydrochlorothiazide (HYDRODIURIL) 25 mg tablet Take 25 mg by mouth daily. Yes Provider, Historical   pantoprazole (PROTONIX) 40 mg tablet Take 40 mg by mouth daily. Provider, Historical   estradiol-norethindrone (MIMVEY) 1-0.5 mg per tablet Take 1 Tab by mouth daily. 11/3/10   Provider, Historical     Current Facility-Administered Medications   Medication Dose Route Frequency    sodium chloride (NS) flush 5-40 mL  5-40 mL IntraVENous Q8H    sodium chloride (NS) flush 5-40 mL  5-40 mL IntraVENous PRN    naloxone (NARCAN) injection 0.4 mg  0.4 mg IntraVENous PRN    [START ON 1/12/2019] atorvastatin (LIPITOR) tablet 20 mg  20 mg Oral DAILY    [START ON 1/12/2019] metoprolol succinate (TOPROL-XL) XL tablet 12.5 mg  12.5 mg Oral DAILY    [START ON 1/12/2019] pantoprazole (PROTONIX) tablet 40 mg  40 mg Oral DAILY    fentaNYL citrate (PF) injection 25 mcg  25 mcg IntraVENous Q4H PRN    fentaNYL citrate (PF) injection 50 mcg  50 mcg IntraVENous Q4H PRN    potassium chloride (KLOR-CON) packet for solution 40 mEq  40 mEq Oral BID WITH MEALS      No Known Allergies     Review of Systems:  Pertinent items are noted in HPI.     Mental Status: no dementia    Objective:     Patient Vitals for the past 8 hrs:   BP Temp Pulse Resp SpO2 Weight   01/11/19 2210 150/75 98.5 °F (36.9 °C) 72 16 98 %    01/11/19 2030 (!) 148/105    98 %    01/11/19 1900 158/80    95 %    01/11/19 1830 138/77   14 94 %    01/11/19 1800 (!) 143/108   14 98 %    01/11/19 1741 161/75 99 °F (37.2 °C) 87 16 96 % 75.2 kg (165 lb 12.6 oz)     Temp (24hrs), Av.8 °F (37.1 °C), Min:98.5 °F (36.9 °C), Max:99 °F (37.2 °C)      EXAM: Awake and alert lying in bed; NAD; agreeable to exam  Right leg is externally rotated and held in a position of slight flexion - appears somewhat shortened when compared to left but cannot be easily extended due to pain  Log roll of leg increases pain  Distal sensory function grossly intact  5/5 strength for ankle plantar and dorsiflexion  Distal pulses palpable    Imaging Review: EXAM: XR FEMUR RT 2 VS, XR HIP RT W OR WO PELV 2-3 VWS     INDICATION: eval for fx. Fall. Hip pain.     COMPARISON: None.     FINDINGS: Two views of the right femur. AP pelvis and right hip frog leg. There  is an acute intertrochanteric fracture of the right femur. The femoral shaft is  retracted by approximately 1.3 cm. The lesser trochanter is split and displaced  2.7 cm superior medially. No dislocation. Moderate degenerative changes are seen  in the pubic symphysis.     IMPRESSION  IMPRESSION: Acute mildly displaced intertrochanteric fracture of the proximal  right femur. .    Labs:   Recent Results (from the past 24 hour(s))   PROTHROMBIN TIME + INR    Collection Time: 19  6:00 PM   Result Value Ref Range    INR 1.0 0.9 - 1.1      Prothrombin time 9.8 9.0 - 11.1 sec   PTT    Collection Time: 19  6:00 PM   Result Value Ref Range    aPTT 21.7 (L) 22.1 - 32.0 sec    aPTT, therapeutic range     58.0 - 77.0 SECS   CREATININE    Collection Time: 19  6:00 PM   Result Value Ref Range    Creatinine 1.06 (H) 0.55 - 1.02 MG/DL    GFR est AA >60 >60 ml/min/1.73m2    GFR est non-AA 51 (L) >60 ml/min/1.73m2   MAGNESIUM    Collection Time: 19  6:00 PM   Result Value Ref Range    Magnesium 2.0 1.6 - 2.4 mg/dL   CBC WITH AUTOMATED DIFF    Collection Time: 19  6:04 PM   Result Value Ref Range    WBC 15.9 (H) 3.6 - 11.0 K/uL    RBC 4.40 3.80 - 5.20 M/uL    HGB 12.3 11.5 - 16.0 g/dL    HCT 37.9 35.0 - 47.0 %    MCV 86.1 80.0 - 99.0 FL    MCH 28.0 26.0 - 34.0 PG    MCHC 32.5 30.0 - 36.5 g/dL    RDW 13.6 11.5 - 14.5 %    PLATELET 517 356 - 128 K/uL    MPV 10.5 8.9 - 12.9 FL    NRBC 0.0 0  WBC    ABSOLUTE NRBC 0.00 0.00 - 0.01 K/uL    NEUTROPHILS 89 (H) 32 - 75 %    LYMPHOCYTES 5 (L) 12 - 49 %    MONOCYTES 5 5 - 13 %    EOSINOPHILS 0 0 - 7 %    BASOPHILS 0 0 - 1 %    IMMATURE GRANULOCYTES 1 (H) 0.0 - 0.5 %    ABS. NEUTROPHILS 14.1 (H) 1.8 - 8.0 K/UL    ABS. LYMPHOCYTES 0.8 0.8 - 3.5 K/UL    ABS. MONOCYTES 0.8 0.0 - 1.0 K/UL    ABS. EOSINOPHILS 0.0 0.0 - 0.4 K/UL    ABS. BASOPHILS 0.0 0.0 - 0.1 K/UL    ABS. IMM. GRANS. 0.2 (H) 0.00 - 0.04 K/UL    DF AUTOMATED      RBC COMMENTS NORMOCYTIC, NORMOCHROMIC     METABOLIC PANEL, COMPREHENSIVE    Collection Time: 01/11/19  6:04 PM   Result Value Ref Range    Sodium 140 136 - 145 mmol/L    Potassium 2.9 (L) 3.5 - 5.1 mmol/L    Chloride 100 97 - 108 mmol/L    CO2 26 21 - 32 mmol/L    Anion gap 14 5 - 15 mmol/L    Glucose 136 (H) 65 - 100 mg/dL    BUN 13 6 - 20 MG/DL    Creatinine 1.09 (H) 0.55 - 1.02 MG/DL    BUN/Creatinine ratio 12 12 - 20      GFR est AA 60 (L) >60 ml/min/1.73m2    GFR est non-AA 49 (L) >60 ml/min/1.73m2    Calcium 9.6 8.5 - 10.1 MG/DL    Bilirubin, total 0.6 0.2 - 1.0 MG/DL    ALT (SGPT) 22 12 - 78 U/L    AST (SGOT) 23 15 - 37 U/L    Alk.  phosphatase 74 45 - 117 U/L    Protein, total 7.1 6.4 - 8.2 g/dL    Albumin 3.6 3.5 - 5.0 g/dL    Globulin 3.5 2.0 - 4.0 g/dL    A-G Ratio 1.0 (L) 1.1 - 2.2     URINALYSIS W/MICROSCOPIC    Collection Time: 01/11/19  7:00 PM   Result Value Ref Range    Color YELLOW/STRAW      Appearance CLEAR CLEAR      Specific gravity 1.025 1.003 - 1.030      pH (UA) 6.0 5.0 - 8.0      Protein NEGATIVE  NEG mg/dL    Glucose NEGATIVE  NEG mg/dL    Ketone 15 (A) NEG mg/dL    Bilirubin NEGATIVE  NEG      Blood NEGATIVE  NEG      Urobilinogen 0.2 0.2 - 1.0 EU/dL    Nitrites NEGATIVE  NEG      Leukocyte Esterase NEGATIVE  NEG      WBC 0-4 0 - 4 /hpf RBC 0-5 0 - 5 /hpf    Epithelial cells MODERATE (A) FEW /lpf    Bacteria NEGATIVE  NEG /hpf   URINE CULTURE HOLD SAMPLE    Collection Time: 01/11/19  7:00 PM   Result Value Ref Range    Urine culture hold        URINE ON HOLD IN MICROBIOLOGY DEPT FOR 3 DAYS. IF UNPRESERVED URINE IS SUBMITTED, IT CANNOT BE USED FOR ADDITIONAL TESTING AFTER 24 HRS, RECOLLECTION WILL BE REQUIRED.          Impression:     Active Problems:    Hip fracture ([facility] [address] (1/11/2019)        Plan:     Acute right intertrochanteric hip fracture - will require surgical fixation  Admitted to Medicine for pre-op eval and medical management  Pain control  Bed rest  NPO after midnight  Mechanical DVT prophylaxis for now  Bowen placement  Consent for IM nail signed and on chart  To OR Sat morning pending no clearance issues    Dr Cooper Holding aware of patient and agrees with current plan of care      Charisma Olivares PA-C  Orthopedic Trauma Service  [address] [facility]

## 2019-01-12 NOTE — ED NOTES
TRANSFER - OUT REPORT: 
 
Verbal report given to Jonas(name) on Adriano Durand  being transferred to (unit) for routine progression of care Report consisted of patients Situation, Background, Assessment and  
Recommendations(SBAR). Information from the following report(s) SBAR and ED Summary was reviewed with the receiving nurse. Lines:  
Peripheral IV 01/11/19 Left Antecubital (Active) Site Assessment Clean, dry, & intact 1/11/2019  8:44 PM  
Phlebitis Assessment 0 1/11/2019  8:44 PM  
Infiltration Assessment 0 1/11/2019  8:44 PM  
Dressing Status Clean, dry, & intact 1/11/2019  8:44 PM  
Dressing Type Transparent 1/11/2019  8:44 PM  
  
 
Opportunity for questions and clarification was provided. Patient transported with:  TOMMIE

## 2019-01-12 NOTE — PERIOP NOTES
Right leg and foot off of bed. Right lateral leg 6 inches up from ankle with red 1/2 indentation from railing of bed. Patient denied having sensation in that area. Area lateral 3 inches. Area did not reji when pressed. 65 Dr Jorge Alberto heard updated on stats of red area and viewed area. Leg marked.

## 2019-01-12 NOTE — ED NOTES
Transported to [facility] with [facility] via stretcher. Pt tolerated move to stretcher well.

## 2019-01-12 NOTE — ANESTHESIA POSTPROCEDURE EVALUATION
Procedure(s): RIGHT FEMUR INSERTION INTRA MEDULLARY NAIL. Anesthesia Post Evaluation Patient location during evaluation: PACU Patient participation: complete - patient participated Level of consciousness: awake and alert Pain management: adequate Airway patency: patent Anesthetic complications: no 
Cardiovascular status: acceptable Respiratory status: acceptable Hydration status: acceptable Comments: Seen and ready for discharge Post anesthesia nausea and vomiting:  none Visit Vitals /51 (BP 1 Location: Right arm, BP Patient Position: At rest;Head of bed elevated (Comment degrees)) Pulse 60 Temp 36.6 °C (97.9 °F) Resp 18 Wt 75.2 kg (165 lb 12.6 oz) SpO2 98% BMI 29.37 kg/m²

## 2019-01-13 LAB
ANION GAP SERPL CALC-SCNC: 7 MMOL/L (ref 5–15)
ATRIAL RATE: 64 BPM
BUN SERPL-MCNC: 7 MG/DL (ref 6–20)
BUN/CREAT SERPL: 9 (ref 12–20)
CALCIUM SERPL-MCNC: 7.7 MG/DL (ref 8.5–10.1)
CALCULATED P AXIS, ECG09: 58 DEGREES
CALCULATED R AXIS, ECG10: -31 DEGREES
CALCULATED T AXIS, ECG11: 59 DEGREES
CHLORIDE SERPL-SCNC: 109 MMOL/L (ref 97–108)
CO2 SERPL-SCNC: 23 MMOL/L (ref 21–32)
CREAT SERPL-MCNC: 0.75 MG/DL (ref 0.55–1.02)
DIAGNOSIS, 93000: NORMAL
GLUCOSE SERPL-MCNC: 113 MG/DL (ref 65–100)
HGB BLD-MCNC: 8.1 G/DL (ref 11.5–16)
P-R INTERVAL, ECG05: 164 MS
POTASSIUM SERPL-SCNC: 3.4 MMOL/L (ref 3.5–5.1)
Q-T INTERVAL, ECG07: 288 MS
QRS DURATION, ECG06: 82 MS
QTC CALCULATION (BEZET), ECG08: 297 MS
SODIUM SERPL-SCNC: 139 MMOL/L (ref 136–145)
VENTRICULAR RATE, ECG03: 64 BPM

## 2019-01-13 PROCEDURE — 97161 PT EVAL LOW COMPLEX 20 MIN: CPT

## 2019-01-13 PROCEDURE — 74011250637 HC RX REV CODE- 250/637: Performed by: PHYSICIAN ASSISTANT

## 2019-01-13 PROCEDURE — 74011250637 HC RX REV CODE- 250/637: Performed by: HOSPITALIST

## 2019-01-13 PROCEDURE — 85018 HEMOGLOBIN: CPT

## 2019-01-13 PROCEDURE — 36415 COLL VENOUS BLD VENIPUNCTURE: CPT

## 2019-01-13 PROCEDURE — 97116 GAIT TRAINING THERAPY: CPT

## 2019-01-13 PROCEDURE — 97110 THERAPEUTIC EXERCISES: CPT

## 2019-01-13 PROCEDURE — 97165 OT EVAL LOW COMPLEX 30 MIN: CPT

## 2019-01-13 PROCEDURE — 74011000250 HC RX REV CODE- 250: Performed by: PHYSICIAN ASSISTANT

## 2019-01-13 PROCEDURE — 97535 SELF CARE MNGMENT TRAINING: CPT

## 2019-01-13 PROCEDURE — 74011250636 HC RX REV CODE- 250/636: Performed by: PHYSICIAN ASSISTANT

## 2019-01-13 PROCEDURE — 65270000029 HC RM PRIVATE

## 2019-01-13 PROCEDURE — 80048 BASIC METABOLIC PNL TOTAL CA: CPT

## 2019-01-13 RX ORDER — FERROUS SULFATE, DRIED 160(50) MG
1 TABLET, EXTENDED RELEASE ORAL
Status: DISCONTINUED | OUTPATIENT
Start: 2019-01-13 | End: 2019-01-15 | Stop reason: HOSPADM

## 2019-01-13 RX ORDER — CEFAZOLIN SODIUM/WATER 2 G/20 ML
2 SYRINGE (ML) INTRAVENOUS EVERY 8 HOURS
Status: COMPLETED | OUTPATIENT
Start: 2019-01-13 | End: 2019-01-13

## 2019-01-13 RX ORDER — FACIAL-BODY WIPES
10 EACH TOPICAL DAILY PRN
Status: DISCONTINUED | OUTPATIENT
Start: 2019-01-14 | End: 2019-01-15 | Stop reason: HOSPADM

## 2019-01-13 RX ORDER — SODIUM CHLORIDE 0.9 % (FLUSH) 0.9 %
5-40 SYRINGE (ML) INJECTION AS NEEDED
Status: DISCONTINUED | OUTPATIENT
Start: 2019-01-13 | End: 2019-01-15 | Stop reason: HOSPADM

## 2019-01-13 RX ORDER — ACETAMINOPHEN 325 MG/1
650 TABLET ORAL EVERY 6 HOURS
Status: DISCONTINUED | OUTPATIENT
Start: 2019-01-13 | End: 2019-01-15 | Stop reason: HOSPADM

## 2019-01-13 RX ORDER — POLYETHYLENE GLYCOL 3350 17 G/17G
17 POWDER, FOR SOLUTION ORAL DAILY
Status: DISCONTINUED | OUTPATIENT
Start: 2019-01-13 | End: 2019-01-15 | Stop reason: HOSPADM

## 2019-01-13 RX ORDER — SODIUM CHLORIDE 0.9 % (FLUSH) 0.9 %
5-40 SYRINGE (ML) INJECTION EVERY 8 HOURS
Status: DISCONTINUED | OUTPATIENT
Start: 2019-01-13 | End: 2019-01-15 | Stop reason: HOSPADM

## 2019-01-13 RX ORDER — NALOXONE HYDROCHLORIDE 0.4 MG/ML
0.4 INJECTION, SOLUTION INTRAMUSCULAR; INTRAVENOUS; SUBCUTANEOUS AS NEEDED
Status: DISCONTINUED | OUTPATIENT
Start: 2019-01-13 | End: 2019-01-15 | Stop reason: HOSPADM

## 2019-01-13 RX ORDER — ENOXAPARIN SODIUM 100 MG/ML
40 INJECTION SUBCUTANEOUS DAILY
Status: DISCONTINUED | OUTPATIENT
Start: 2019-01-13 | End: 2019-01-15 | Stop reason: HOSPADM

## 2019-01-13 RX ORDER — ONDANSETRON 2 MG/ML
4 INJECTION INTRAMUSCULAR; INTRAVENOUS
Status: ACTIVE | OUTPATIENT
Start: 2019-01-13 | End: 2019-01-14

## 2019-01-13 RX ORDER — ONDANSETRON 4 MG/1
4 TABLET, ORALLY DISINTEGRATING ORAL
Status: DISCONTINUED | OUTPATIENT
Start: 2019-01-13 | End: 2019-01-15 | Stop reason: HOSPADM

## 2019-01-13 RX ORDER — GUAIFENESIN 100 MG/5ML
81 LIQUID (ML) ORAL DAILY
Status: DISCONTINUED | OUTPATIENT
Start: 2019-01-13 | End: 2019-01-15 | Stop reason: HOSPADM

## 2019-01-13 RX ORDER — OXYCODONE HYDROCHLORIDE 5 MG/1
2.5 TABLET ORAL
Status: DISCONTINUED | OUTPATIENT
Start: 2019-01-13 | End: 2019-01-15 | Stop reason: HOSPADM

## 2019-01-13 RX ORDER — OXYCODONE HYDROCHLORIDE 5 MG/1
5 TABLET ORAL
Status: DISCONTINUED | OUTPATIENT
Start: 2019-01-13 | End: 2019-01-13 | Stop reason: SDUPTHER

## 2019-01-13 RX ORDER — AMOXICILLIN 250 MG
1 CAPSULE ORAL 2 TIMES DAILY
Status: DISCONTINUED | OUTPATIENT
Start: 2019-01-13 | End: 2019-01-15 | Stop reason: HOSPADM

## 2019-01-13 RX ADMIN — ACETAMINOPHEN 650 MG: 325 TABLET ORAL at 12:14

## 2019-01-13 RX ADMIN — SODIUM CHLORIDE 125 ML/HR: 900 INJECTION, SOLUTION INTRAVENOUS at 04:51

## 2019-01-13 RX ADMIN — CALCIUM CARBONATE-VITAMIN D TAB 500 MG-200 UNIT 1 TABLET: 500-200 TAB at 08:27

## 2019-01-13 RX ADMIN — Medication 2 G: at 07:10

## 2019-01-13 RX ADMIN — Medication 2 G: at 01:04

## 2019-01-13 RX ADMIN — OXYCODONE HYDROCHLORIDE 5 MG: 5 TABLET ORAL at 14:50

## 2019-01-13 RX ADMIN — STANDARDIZED SENNA CONCENTRATE AND DOCUSATE SODIUM 1 TABLET: 8.6; 5 TABLET, FILM COATED ORAL at 08:27

## 2019-01-13 RX ADMIN — PANTOPRAZOLE SODIUM 40 MG: 40 TABLET, DELAYED RELEASE ORAL at 08:27

## 2019-01-13 RX ADMIN — METOPROLOL SUCCINATE 12.5 MG: 25 TABLET, EXTENDED RELEASE ORAL at 08:27

## 2019-01-13 RX ADMIN — OXYCODONE HYDROCHLORIDE 5 MG: 5 TABLET ORAL at 19:08

## 2019-01-13 RX ADMIN — POTASSIUM CHLORIDE 40 MEQ: 1.5 POWDER, FOR SOLUTION ORAL at 08:27

## 2019-01-13 RX ADMIN — POTASSIUM CHLORIDE 40 MEQ: 1.5 POWDER, FOR SOLUTION ORAL at 17:07

## 2019-01-13 RX ADMIN — ENOXAPARIN SODIUM 40 MG: 40 INJECTION SUBCUTANEOUS at 08:27

## 2019-01-13 RX ADMIN — Medication 10 ML: at 06:50

## 2019-01-13 RX ADMIN — POLYETHYLENE GLYCOL 3350 17 G: 17 POWDER, FOR SOLUTION ORAL at 09:00

## 2019-01-13 RX ADMIN — Medication 10 ML: at 01:09

## 2019-01-13 RX ADMIN — OXYCODONE HYDROCHLORIDE 5 MG: 5 TABLET ORAL at 10:37

## 2019-01-13 RX ADMIN — CALCIUM CARBONATE-VITAMIN D TAB 500 MG-200 UNIT 1 TABLET: 500-200 TAB at 17:07

## 2019-01-13 RX ADMIN — ACETAMINOPHEN 650 MG: 325 TABLET ORAL at 01:04

## 2019-01-13 RX ADMIN — ATORVASTATIN CALCIUM 20 MG: 20 TABLET, FILM COATED ORAL at 08:27

## 2019-01-13 RX ADMIN — OXYCODONE HYDROCHLORIDE 5 MG: 5 TABLET ORAL at 06:51

## 2019-01-13 RX ADMIN — OXYCODONE HYDROCHLORIDE 5 MG: 5 TABLET ORAL at 22:56

## 2019-01-13 RX ADMIN — ASPIRIN 81 MG CHEWABLE TABLET 81 MG: 81 TABLET CHEWABLE at 08:27

## 2019-01-13 RX ADMIN — ACETAMINOPHEN 650 MG: 325 TABLET ORAL at 20:17

## 2019-01-13 RX ADMIN — Medication 10 ML: at 06:51

## 2019-01-13 RX ADMIN — CALCIUM CARBONATE-VITAMIN D TAB 500 MG-200 UNIT 1 TABLET: 500-200 TAB at 12:14

## 2019-01-13 RX ADMIN — ACETAMINOPHEN 650 MG: 325 TABLET ORAL at 06:51

## 2019-01-13 NOTE — PROGRESS NOTES
ORTH FRACTURE PROGRESS NOTE 2019 Admit Date:  
2019 Post Op day: 1 Day Post-Op Subjective:   
Harvey Jones states that she is feeling relatively well. Some deep soreness but pain improved from prior to surgery. No new complaints. Wants to go home after discharge. Tolerating diet Denies N/V/SOB or CP  
 
PT/OT:  
Gait:    
            
 
Vital Signs:   
Patient Vitals for the past 8 hrs: 
 BP Temp Pulse Resp SpO2 Weight 19 0825 113/63 98.3 °F (36.8 °C) 75 16 93 %   
19 0651      75.2 kg (165 lb 12.6 oz) 19 0636 106/65 98.2 °F (36.8 °C) 90 16 95 %   
19 0340 111/65 98.5 °F (36.9 °C) 79 16 94 %  Temp (24hrs), Av °F (36.7 °C), Min:96.9 °F (36.1 °C), Max:98.6 °F (37 °C) Pain Control:  
Pain Assessment Pain Scale 1: Numeric (0 - 10) Pain Intensity 1: 3 Pain Onset 1: post op Pain Location 1: Incisional, Hip, Leg, Knee Pain Orientation 1: Right, Lower Pain Description 1: Aching, Burning, Sore Pain Intervention(s) 1: Medication (see MAR), Family Support, Position, Ice Meds: 
 
Current Facility-Administered Medications Medication Dose Route Frequency  aspirin chewable tablet 81 mg  81 mg Oral DAILY  sodium chloride (NS) flush 5-40 mL  5-40 mL IntraVENous Q8H  
 sodium chloride (NS) flush 5-40 mL  5-40 mL IntraVENous PRN  
 acetaminophen (TYLENOL) tablet 650 mg  650 mg Oral Q6H  
 oxyCODONE IR (ROXICODONE) tablet 2.5 mg  2.5 mg Oral Q4H PRN  
 naloxone (NARCAN) injection 0.4 mg  0.4 mg IntraVENous PRN  
 ondansetron (ZOFRAN ODT) tablet 4 mg  4 mg Oral Q6H PRN  
 ondansetron (ZOFRAN) injection 4 mg  4 mg IntraVENous Q4H PRN  
 calcium-vitamin D (OS-LINDA) 500 mg-200 unit tablet  1 Tab Oral TID WITH MEALS  senna-docusate (PERICOLACE) 8.6-50 mg per tablet 1 Tab  1 Tab Oral BID  polyethylene glycol (MIRALAX) packet 17 g  17 g Oral DAILY  [START ON 2019] bisacodyl (DULCOLAX) suppository 10 mg  10 mg Rectal DAILY PRN  
 enoxaparin (LOVENOX) injection 40 mg  40 mg SubCUTAneous DAILY  0.9% sodium chloride infusion  125 mL/hr IntraVENous CONTINUOUS  
 sodium chloride (NS) flush 5-40 mL  5-40 mL IntraVENous Q8H  
 sodium chloride (NS) flush 5-40 mL  5-40 mL IntraVENous PRN  
 naloxone (NARCAN) injection 0.4 mg  0.4 mg IntraVENous PRN  
 atorvastatin (LIPITOR) tablet 20 mg  20 mg Oral DAILY  metoprolol succinate (TOPROL-XL) XL tablet 12.5 mg  12.5 mg Oral DAILY  pantoprazole (PROTONIX) tablet 40 mg  40 mg Oral DAILY  fentaNYL citrate (PF) injection 25 mcg  25 mcg IntraVENous Q4H PRN  
 fentaNYL citrate (PF) injection 50 mcg  50 mcg IntraVENous Q4H PRN  potassium chloride (KLOR-CON) packet for solution 40 mEq  40 mEq Oral BID WITH MEALS  cyclobenzaprine (FLEXERIL) tablet 5 mg  5 mg Oral TID PRN  
 oxyCODONE IR (ROXICODONE) tablet 5 mg  5 mg Oral Q4H PRN  
 
 
LAB:   
Recent Labs  
  01/13/19 
0649 01/12/19 
0300  01/11/19 
1800 HCT  --  34.9*   < >  --   
HGB 8.1* 11.2*   < >  --   
INR  --   --   --  1.0  
 < > = values in this interval not displayed. Transfuse PRBC's:   
 
Assessment & Physician's Comment: 
Thigh and calf soft and non-tender Log roll of leg with some tenderness Dressing is clean, dry, and intact Neurovascular checks within normal limits Orientation:  Oriented Acute postoperative blood loss Principal Problem: 
  Hip fracture ([address] (1/11/2019) Plan: 
 
PT/OT - 50% weight bear with flat foot gait Cont current pain management Lovenox 40 for 14 days then 81mg ASA BID for 14 days Medical management per primary team 
Case management for disposition planning -  with Parkinson's at home John Beebe PA-C Orthopedic Trauma Service [facility] [facility]

## 2019-01-13 NOTE — PROGRESS NOTES
PATIENT ON GETTING OUT OF BED,BECAUSESHE WAS STIFF. PATIENT ASSISTED TO EDGE OF BED, FOR A FEW  MINUTES BUT GOT LIGHTHEADED ASSISTED WITH 2 NURSES BACK TO BED. CONDITION STABLE WILL CONTINUE WITH IV FLUIDS FOR HYDRATION WHICH WAS STOPPED FOR SOME REASON PRIOR SARAH SHIFT.

## 2019-01-13 NOTE — PROGRESS NOTES
Bedside and Verbal shift change report given to [address] (oncoming nurse) by Elvia Dietz (offgoing nurse). Report included the following information SBAR, Kardex, Intake/Output, MAR and Recent Results.

## 2019-01-13 NOTE — PROGRESS NOTES
Problem: Mobility Impaired (Adult and Pediatric) Goal: *Acute Goals and Plan of Care (Insert Text) Physical Therapy Goals Initiated 1/13/2019 1. Patient will move from supine to sit and sit to supine  in bed with supervision/set-up within 4 days. 2. Patient will perform sit to stand with modified independence within 4 days. 3. Patient will ambulate with supervision/set-up for 200 feet with the least restrictive device within 4 days. 4. Patient will ascend/descend 3 stairs with 1 handrail(s) with modified independence within 4 days. 5. Patient will perform hip home exercise program per protocol with modified independence within 4 days. physical Therapy EVALUATION Patient: Kia Brizuela (84 y.o. female) Date: 1/13/2019 Primary Diagnosis: Hip fracture ([address] Procedure(s) (LRB): 
RIGHT FEMUR INSERTION INTRA MEDULLARY NAIL (Right) 1 Day Post-Op Precautions:   Fall ASSESSMENT : 
Based on the objective data described below, the patient presents with limited right WB, impaired ROM and strength following admission for a right femur fx and nailing. Prior to admission this patient was living with her  who has Parkinson's (she is his primary caregiver) disease. She was independent for all self-care and mobility prior to admission. During evaluation she demonstrated pain related gait deviations with decreased right step clearance, decreased left step length, and slow overall mobility. Anticipate great recovery and progress towards functional goals. Due to her independent prior level, WB restrictions, and being the caregiver for her , recommend discharge to IP rehab prior to returning home. Patient will benefit from skilled intervention to address the above impairments. Patients rehabilitation potential is considered to be Good Factors which may influence rehabilitation potential include:  
[x]         None noted 
[]         Mental ability/status []         Medical condition []         Home/family situation and support systems 
[]         Safety awareness 
[]         Pain tolerance/management 
[]         Other: PLAN : 
Recommendations and Planned Interventions: 
[x]           Bed Mobility Training             [x]    Neuromuscular Re-Education 
[x]           Transfer Training                   []    Orthotic/Prosthetic Training 
[x]           Gait Training                         []    Modalities [x]           Therapeutic Exercises           []    Edema Management/Control 
[x]           Therapeutic Activities            []    Patient and Family Training/Education 
[]           Other (comment): Frequency/Duration: Patient will be followed by physical therapy  twice daily to address goals. Discharge Recommendations: Inpatient Rehab Further Equipment Recommendations for Discharge: to be determined SUBJECTIVE:  
Patient stated Kenrick Whitfield will do whatever I need to, to get better.  OBJECTIVE DATA SUMMARY:  
HISTORY:   
Past Medical History:  
Diagnosis Date  Hypertension  Other ill-defined conditions(799.89)   
 blood in urine being eval. to this date  Skin cancer   
 neck, face Past Surgical History:  
Procedure Laterality Date  HX GYN    
 lap BTL  HX MOHS PROCEDURES  02/12/2018 BCC posterior base of neck by Dr. Andrea Welch Prior Level of Function/Home Situation: independent Personal factors and/or comorbidities impacting plan of care: caregiver for her  Home Situation Home Environment: Private residence # Steps to Enter: 2 Rails to Enter: Yes Hand Rails : Bilateral 
Wheelchair Ramp: Yes One/Two Story Residence: One story Living Alone: No 
Support Systems: Child(isrrael), Spouse/Significant Other/Partner Patient Expects to be Discharged to[de-identified] Private residence Current DME Used/Available at Home: Grab bars, Raised toilet seat Tub or Shower Type: Tub/Shower combination EXAMINATION/PRESENTATION/DECISION MAKING: Critical Behavior: Neurologic State: Alert Orientation Level: Oriented X4 Cognition: Appropriate for age attention/concentration, Follows commands Safety/Judgement: Awareness of environment, Fall prevention Hearing: Auditory Auditory Impairment: NoneSkin:  Dressing intact Edema:  
Range Of Motion: 
AROM: Generally decreased, functional(except right hip post fx) Strength:   
Strength: Generally decreased, functional(except right hip post fx) Tone & Sensation:  
Tone: Normal 
  
  
  
  
Sensation: Intact Coordination: 
Coordination: Within functional limits Functional Mobility: 
Bed Mobility: 
  
Supine to Sit: Moderate assistance Sit to Supine: Minimum assistance Scooting: Minimum assistance; Moderate assistance Transfers: 
Sit to Stand: Moderate assistance Stand to Sit: Minimum assistance Bed to Chair: Contact guard assistance; Adaptive equipment; Additional time;Assist x1(Infer) Balance:  
Sitting: Intact Standing: Impaired; With support Standing - Static: Good Standing - Dynamic : FairAmbulation/Gait Training:Distance (ft): 30 Feet (ft) Assistive Device: Gait belt;Walker, rolling Ambulation - Level of Assistance: Minimal assistance Gait Description (WDL): Exceptions to [facility] Gait Abnormalities: Decreased step clearance Right Side Weight Bearing: Partial (%)(50) Base of Support: Widened Stance: Right decreased Speed/Antonette: Pace decreased (<100 feet/min) Step Length: Left shortened Swing Pattern: Right asymmetrical 
  
 
Therapeutic Exercises:  
Supine ankle pumps, quad sets, ham sets, hip abd/add, glute sets x10 Physical Therapy Evaluation Charge Determination History Examination Presentation Decision-Making HIGH Complexity :3+ comorbidities / personal factors will impact the outcome/ POC  MEDIUM Complexity : 3 Standardized tests and measures addressing body structure, function, activity limitation and / or participation in recreation  LOW Complexity : Stable, uncomplicated  LOW Complexity : FOTO score of  Based on the above components, the patient evaluation is determined to be of the following complexity level: LOW Pain: 
Pain Scale 1: Numeric (0 - 10) Pain Intensity 1: 4 Activity Tolerance:  
 
Please refer to the flowsheet for vital signs taken during this treatment. After treatment:  
[x]         Patient left in no apparent distress sitting up in chair 
[]         Patient left in no apparent distress in bed 
[x]         Call bell left within reach [x]         Nursing notified 
[]         Caregiver present 
[]         Bed alarm activated COMMUNICATION/EDUCATION:  
The patients plan of care was discussed with: Registered Nurse. [x]         Fall prevention education was provided and the patient/caregiver indicated understanding. [x]         Patient/family have participated as able in goal setting and plan of care. [x]         Patient/family agree to work toward stated goals and plan of care. []         Patient understands intent and goals of therapy, but is neutral about his/her participation. []         Patient is unable to participate in goal setting and plan of care. Thank you for this referral. 
Jorge L Talavera, PT, DPT Time Calculation: 25 mins

## 2019-01-13 NOTE — PROGRESS NOTES
Hospitalist Progress Note Simi Tobar MD 
Answering service: [phone] OR [phone] from in house phone Date of Service:  2019 NAME:  Hazel Ruiz :  1945 MRN:  [id number] Admission Summary:  
68 y.o F with PMH of HTN,HLD came to the hospital because of a fall. She was found to have rt hip fracture,admitted for further management. Interval history / Subjective:  
 Patient states that rt hip is better. Felt slightly lightheaded when she sat at the edge of bed this morning. Assessment & Plan: #Displaced intertochanteric fracture of rt femur: 
-X ray -Acute mildly displaced intertrochanteric fracture of the proximal right femur. . 
-ortho following. 
-s/p intramedullary fixation on 2019 
-pain management per surgery team 
-bowel regimen. 
-will follow up on Hb levels -pending 
-Pt/OT #HTN: 
-will continue metoprolol/ Hold hctz in the alla Op setting #Hypokalemia: replete. #CKD stage 2: monitor creatinine #Leukocytosis:resolved -stress related Code status: full DVT prophylaxis: lovenox Care Plan discussed with: Patient/Family and Nurse Disposition: TBD Hospital Problems  Date Reviewed: 2018 Codes Class Noted POA * (Principal) Hip fracture [facility] ICD-10-CM: F69.523N ICD-9-CM: 820.8  2019 Yes Review of Systems: As per HPI Vital Signs:  
 Last 24hrs VS reviewed since prior progress note. Most recent are: 
Visit Vitals /65 (BP 1 Location: Left arm, BP Patient Position: At rest) Pulse 90 Temp 98.2 °F (36.8 °C) Resp 16 Wt 75.2 kg (165 lb 12.6 oz) SpO2 95% BMI 29.37 kg/m² Intake/Output Summary (Last 24 hours) at 2019 0800 Last data filed at 2019 5427 Gross per 24 hour Intake 1160 ml Output 2550 ml Net -1390 ml Physical Examination: Constitutional:  No acute distress, cooperative, pleasant   
ENT:  Oral mucous moist, oropharynx benign. Neck supple, Resp:  CTA bilaterally. No wheezing/rhonchi/rales. No accessory muscle use CV:  Regular rhythm, normal rate, no murmurs, gallops, rubs GI:  Soft, non distended, non tender. normoactive bowel sounds, no hepatosplenomegaly Musculoskeletal:  was able to move the LLE,surgical site dressing intact Neurologic:  no focal deficits Data Review:  
 Review and/or order of clinical lab test 
Review and/or order of tests in the medicine section of [facility] Labs:  
 
Recent Labs  
  01/12/19 
0300 01/11/19 
1804 WBC 8.0 15.9* HGB 11.2* 12.3 HCT 34.9* 37.9  194 Recent Labs  
  01/13/19 
0649 01/12/19 
0300 01/11/19 
1804 01/11/19 
1800  135* 140  --   
K 3.4* 3.7 2.9*  --   
* 103 100  --   
CO2 23 23 26  --   
BUN 7 13 13  --   
CREA 0.75 1.07* 1.09* 1.06* * 143* 136*  --   
CA 7.7* 8.9 9.6  --   
MG  --  2.1  --  2.0 PHOS  --  3.2  --   --   
 
Recent Labs  
  01/12/19 
0300 01/11/19 
1804 SGOT 27 23 ALT 22 22 AP 66 74 TBILI 0.7 0.6 TP 6.9 7.1 ALB 3.2* 3.6 GLOB 3.7 3.5 Recent Labs  
  01/11/19 
1800 INR 1.0 PTP 9.8 APTT 21.7* No results for input(s): FE, TIBC, PSAT, FERR in the last 72 hours. No results found for: FOL, RBCF No results for input(s): PH, PCO2, PO2 in the last 72 hours. No results for input(s): CPK, CKNDX, TROIQ in the last 72 hours. No lab exists for component: CPKMB No results found for: CHOL, CHOLX, CHLST, CHOLV, HDL, LDL, LDLC, DLDLP, TGLX, TRIGL, TRIGP, CHHD, CHHDX No results found for: Berdine Close Lab Results Component Value Date/Time  Color YELLOW/STRAW 01/11/2019 07:00 PM  
 Appearance CLEAR 01/11/2019 07:00 PM  
 Specific gravity 1.025 01/11/2019 07:00 PM  
 pH (UA) 6.0 01/11/2019 07:00 PM  
 Protein NEGATIVE  01/11/2019 07:00 PM  
 Glucose NEGATIVE  01/11/2019 07:00 PM  
 Ketone 15 (A) 01/11/2019 07:00 PM  
 Bilirubin NEGATIVE  01/11/2019 07:00 PM  
 Urobilinogen 0.2 01/11/2019 07:00 PM  
 Nitrites NEGATIVE  01/11/2019 07:00 PM  
 Leukocyte Esterase NEGATIVE  01/11/2019 07:00 PM  
 Epithelial cells MODERATE (A) 01/11/2019 07:00 PM  
 Bacteria NEGATIVE  01/11/2019 07:00 PM  
 WBC 0-4 01/11/2019 07:00 PM  
 RBC 0-5 01/11/2019 07:00 PM  
 
 
 
Medications Reviewed:  
 
Current Facility-Administered Medications Medication Dose Route Frequency  aspirin chewable tablet 81 mg  81 mg Oral DAILY  sodium chloride (NS) flush 5-40 mL  5-40 mL IntraVENous Q8H  
 sodium chloride (NS) flush 5-40 mL  5-40 mL IntraVENous PRN  
 acetaminophen (TYLENOL) tablet 650 mg  650 mg Oral Q6H  
 oxyCODONE IR (ROXICODONE) tablet 2.5 mg  2.5 mg Oral Q4H PRN  
 naloxone (NARCAN) injection 0.4 mg  0.4 mg IntraVENous PRN  
 ondansetron (ZOFRAN ODT) tablet 4 mg  4 mg Oral Q6H PRN  
 ondansetron (ZOFRAN) injection 4 mg  4 mg IntraVENous Q4H PRN  
 calcium-vitamin D (OS-LINDA) 500 mg-200 unit tablet  1 Tab Oral TID WITH MEALS  senna-docusate (PERICOLACE) 8.6-50 mg per tablet 1 Tab  1 Tab Oral BID  polyethylene glycol (MIRALAX) packet 17 g  17 g Oral DAILY  [START ON 1/14/2019] bisacodyl (DULCOLAX) suppository 10 mg  10 mg Rectal DAILY PRN  
 enoxaparin (LOVENOX) injection 40 mg  40 mg SubCUTAneous DAILY  0.9% sodium chloride infusion  125 mL/hr IntraVENous CONTINUOUS  
 sodium chloride (NS) flush 5-40 mL  5-40 mL IntraVENous Q8H  
 sodium chloride (NS) flush 5-40 mL  5-40 mL IntraVENous PRN  
 naloxone (NARCAN) injection 0.4 mg  0.4 mg IntraVENous PRN  
 atorvastatin (LIPITOR) tablet 20 mg  20 mg Oral DAILY  metoprolol succinate (TOPROL-XL) XL tablet 12.5 mg  12.5 mg Oral DAILY  pantoprazole (PROTONIX) tablet 40 mg  40 mg Oral DAILY  fentaNYL citrate (PF) injection 25 mcg  25 mcg IntraVENous Q4H PRN  
 fentaNYL citrate (PF) injection 50 mcg  50 mcg IntraVENous Q4H PRN  
  potassium chloride (KLOR-CON) packet for solution 40 mEq  40 mEq Oral BID WITH MEALS  cyclobenzaprine (FLEXERIL) tablet 5 mg  5 mg Oral TID PRN  
 oxyCODONE IR (ROXICODONE) tablet 5 mg  5 mg Oral Q4H PRN  
 
______________________________________________________________________ EXPECTED LENGTH OF STAY: - - - 
ACTUAL LENGTH OF STAY:          2 Christina Perez MD

## 2019-01-13 NOTE — PROGRESS NOTES
Bedside and Verbal shift change report given to Cliff Dove RN (oncoming nurse) by Corey Lea RN (offgoing nurse). Report included the following information SBAR, Kardex, Intake/Output, MAR and Recent Results.

## 2019-01-13 NOTE — PROGRESS NOTES
Problem: Mobility Impaired (Adult and Pediatric) Goal: *Acute Goals and Plan of Care (Insert Text) Physical Therapy Goals Initiated 1/13/2019 1. Patient will move from supine to sit and sit to supine  in bed with supervision/set-up within 4 days. 2. Patient will perform sit to stand with modified independence within 4 days. 3. Patient will ambulate with supervision/set-up for 200 feet with the least restrictive device within 4 days. 4. Patient will ascend/descend 3 stairs with 1 handrail(s) with modified independence within 4 days. 5. Patient will perform hip home exercise program per protocol with modified independence within 4 days. physical Therapy TREATMENT Patient: Maegan Iniguez (34 y.o. female) Date: 1/13/2019 Diagnosis: Hip fracture ([address] Hip fracture ([address] Procedure(s) (LRB): 
RIGHT FEMUR INSERTION INTRA MEDULLARY NAIL (Right) 1 Day Post-Op Precautions: Fall Chart, physical therapy assessment, plan of care and goals were reviewed. ASSESSMENT: 
Patient making gains towards goals with improved gait distance for the pm. Cues required for sequencing and to improve right step clearance. She continued with diminished right step clearance d/t pain that improved briefly with cues but diminished again with fatigue. Continue to recommend discharge to IP rehab to maximize functional recovery prior to returning home. Progression toward goals: 
[x]      Improving appropriately and progressing toward goals 
[]      Improving slowly and progressing toward goals 
[]      Not making progress toward goals and plan of care will be adjusted PLAN: 
Patient continues to benefit from skilled intervention to address the above impairments. Continue treatment per established plan of care. Discharge Recommendations:  Inpatient Rehab Further Equipment Recommendations for Discharge:  to be determined SUBJECTIVE:  
Mic Milian been up since you got me up this morning\" OBJECTIVE DATA SUMMARY:  
 Functional Mobility Training: 
Bed Mobility: 
  
Supine to Sit: Moderate assistance Sit to Supine: Minimum assistance Scooting: Minimum assistance; Moderate assistance Transfers: 
Sit to Stand: Moderate assistance Stand to Sit: Minimum assistance Bed to Chair: Contact guard assistance; Adaptive equipment; Additional time;Assist x1(Infer) Ambulation/Gait Training: 
Distance (ft): 45 Feet (ft) Assistive Device: Gait belt;Walker, rolling Ambulation - Level of Assistance: Minimal assistance Gait Description (WDL): Exceptions to [facility] Gait Abnormalities: Decreased step clearance Right Side Weight Bearing: Partial (%)(50) Base of Support: Widened Stance: Right decreased Speed/Antonette: Pace decreased (<100 feet/min) Step Length: Left shortened Swing Pattern: Right asymmetrical 
  
  
 
Therapeutic Exercises:  
Exercises performed per protocol. See morning treatment note for description. Pain: 
Pain Scale 1: Numeric (0 - 10) Pain Intensity 1: 6 Activity Tolerance:  
 
Please refer to the flowsheet for vital signs taken during this treatment. After treatment:  
[] Patient left in no apparent distress sitting up in chair 
[x] Patient left in no apparent distress in bed 
[x] Call bell left within reach [x] Nursing notified 
[] Caregiver present 
[] Bed alarm activated COMMUNICATION/COLLABORATION:  
The patients plan of care was discussed with: Registered Nurse Adal Garcia PT, DPT Time Calculation: 24 mins

## 2019-01-13 NOTE — PROGRESS NOTES
Problem: Falls - Risk of 
Goal: *Absence of Falls Document [place] Officer Fall Risk and appropriate interventions in the flowsheet. Outcome: Progressing Towards Goal 
Fall Risk Interventions: 
Mobility Interventions: Patient to call before getting OOB Medication Interventions: Patient to call before getting OOB Elimination Interventions: Call light in reach

## 2019-01-13 NOTE — PROGRESS NOTES
Orders received, chart reviewed and patient evaluated by occupational therapy. Recommend patient to discharge to IP rehab for short rehab stay vs. [place] with 24/7 supervision pending progression with skilled acute occupational therapy. Recommend with nursing patient to complete as able in order to maintain strength, endurance and independence: OOB to chair 3x/day, ADLs with supervision/setup and mobilizing to the [facility] for toileting with 1 assist (with RW). Thank you for your assistance. Full evaluation to follow. [place] L. [place], MS, OTR/L

## 2019-01-13 NOTE — PROGRESS NOTES
Reason for Admission:   Right hip fracture ue to ground level fall. Surgery 1/12/19   Medical hx of hypertension and high blood pressure   PCP is Dr Gerldine Sandhoff. . Has regular visits   No AMD in chart  Full code RRAT Score:      10 Plan for utilizing home health:     Has not been serviced with [facility] in the past     Would like to use [facility] [phone] Likelihood of Readmission:  low Transition of Care Plan:    Home with [facility] or inpatient rehab. Therapy to evaluate and make recommendations. CM met with patient, daughter Cecil Bermeo [phone] and son in law Alma Delia in patient's room to introduce self and explain role. Patient was alert and oriented. She confirmed address, PCP and insurance--Medicare and [employer] . Patient lives with her  Todd Jauregui [phone] in their one level home ([facility]) with daughter and son in law living next door. Todd Jauregui is her 2nd marriage and his children are taking care of him while patient is in the hospital and will continue to care for him while patient is recuperating from surgery Patient was self care and independent prior to admission. .Uses no Dme. Continues to drive. She was petting a pig when she lost her balance and fell. Patient has no stairs and said she would like to return home if possible when discharged. She is not opposed to a short rehab stay if recommended by therapy. She said her daughter, Cecil Bermeo, will be able to stay in the home if she return directly home with [facility] chosen). Therapy is recommending inpatient rehab vs . Patient and family in agreement with Cm sending referral to [facility] and to [facility]. Referrals sent to [address] [place] [address] via [employer] and [facility] via [facility]. CM will follow patient's progress to determine if patient will go to inpatient rehab or return home with [facility]. CM will follow and assist with  safe discharge plan. Care Management Interventions PCP Verified by CM: Yes Mode of Transport at Discharge: (TBD) Transition of Care Consult (CM Consult): Discharge Planning Discharge Durable Medical Equipment: No 
Physical Therapy Consult: Yes Occupational Therapy Consult: Yes Speech Therapy Consult: No 
Current Support Network: Lives with Spouse(lives with  in one level home   self care and indepedent prior to admission. Good family support. No AMD in chart ) Confirm Follow Up Transport: Family Plan discussed with Pt/Family/Caregiver: Yes Freedom of Choice Offered: Yes Discharge Location Discharge Placement: (TBD  Inpatient rehab/ [facility])

## 2019-01-13 NOTE — PROGRESS NOTES
Problem: Self Care Deficits Care Plan (Adult) Goal: *Acute Goals and Plan of Care (Insert Text) Occupational Therapy Goals Initiated 1/13/2019 1. Patient will perform lower body ADLs with AE supervision/set-up within 4 day(s). 2.  Patient will perform upper body ADLs standing 5 mins without fatigue or LOB with supervision/set-up within 4 day(s). 3.  Patient will perform toilet transfer with supervision/set-up within 4 day(s). 4.  Patient will perform all aspects of toileting with supervision/set-up within 4 day(s). 5.  Patient will participate in upper extremity therapeutic exercise/activities with supervision/set-up for 10 minutes within 4 day(s). 6.  Patient will utilize energy conservation techniques during functional activities without cues within 4 day(s). Occupational Therapy EVALUATION Patient: Katerina Dominguez (68 y.o. female) Date: 1/13/2019 Primary Diagnosis: Hip fracture ([address] Procedure(s) (LRB): 
RIGHT FEMUR INSERTION INTRA MEDULLARY NAIL (Right) 1 Day Post-Op Precautions:  Fall ASSESSMENT : 
Based on the objective data described below, the patient presents with overall min A for bed mobility, CGA/min A for functional mobility with RW, IND for upper body ADLs and mod-max A for lower body ADLs s/p admission for GLF resulting in R hip fx with R femur IM nail POD #1. Patient reports at baseline she is IND with ADLs, IADLs and functional mobility and is the PCG for her  with Parkinson's. Daughter and KIM present during evaluation, reporting they can assist with with ADLs PRN. Today, patient ADLs limited by impaired balance, decreased functional activity tolerance, post-op pain, generalized weakness and mild orthostatics when up. Patient may benefit from short rehab stay vs. [facility] with 24/7 supervision/assist pending continued progress with acute rehab.   
 
Recommend with nursing patient to complete as able in order to maintain strength, endurance and independence: ADLs with supervision/setup, OOB to chair 3x/day and mobilizing to the [facility] for toileting with 1 assist (with RW). Thank you for your assistance. Patient will benefit from skilled intervention to address the above impairments. Patients rehabilitation potential is considered to be Good Factors which may influence rehabilitation potential include:  
[]             None noted []             Mental ability/status []             Medical condition []             Home/family situation and support systems []             Safety awareness []             Pain tolerance/management 
[]             Other: PLAN : 
Recommendations and Planned Interventions: 
[x]               Self Care Training                  [x]        Therapeutic Activities [x]               Functional Mobility Training    []        Cognitive Retraining 
[x]               Therapeutic Exercises           [x]        Endurance Activities [x]               Balance Training                   []        Neuromuscular Re-Education []               Visual/Perceptual Training     [x]   Home Safety Training 
[x]               Patient Education                 [x]        Family Training/Education []               Other (comment): Frequency/Duration: Patient will be followed by occupational therapy 5 times a week to address goals. Discharge Recommendations: Home Health OT vs. Inpatient Rehab Further Equipment Recommendations for Discharge: TBD SUBJECTIVE:  
Patient stated I was chasing a pig and lost my footing.  OBJECTIVE DATA SUMMARY:  
HISTORY:  
Past Medical History:  
Diagnosis Date  Hypertension  Other ill-defined conditions(799.89)   
 blood in urine being eval. to this date  Skin cancer   
 neck, face Past Surgical History:  
Procedure Laterality Date  HX GYN    
 lap BTL  HX MOHS PROCEDURES  02/12/2018 BCC posterior base of neck by Dr. Faustina Villa Prior Level of Function/Environment/Context: Patient lives in 1 level home with 2 KALIE and a w/c ramp. Patient was IND with ADLs, IADLs and functional mobility, PCG for . Daughter and KIM able to assist PRN Home Situation Home Environment: Private residence # Steps to Enter: 2 Rails to Enter: Yes Hand Rails : Bilateral 
Wheelchair Ramp: Yes One/Two Story Residence: One story Living Alone: No 
Support Systems: Child(isrrael), Spouse/Significant Other/Partner Patient Expects to be Discharged to[de-identified] Private residence Current DME Used/Available at Home: Grab bars, Raised toilet seat Tub or Shower Type: Tub/Shower combination Hand dominance: RightEXAMINATION OF PERFORMANCE DEFICITS: 
Cognitive/Behavioral Status: 
Neurologic State: Alert Orientation Level: Oriented X4 Cognition: Appropriate for age attention/concentration; Follows commands Perception: Appears intact Perseveration: No perseveration noted Safety/Judgement: Awareness of environment; Fall preventionSkin: Appears grossly intact Edema: none noted in BUEs Hearing: Auditory Auditory Impairment: NoneVision/Perceptual:   
Tracking: Able to track stimulus in all quadrants w/o difficulty Diplopia: No   
Acuity: Able to read clock/calendar on wall without difficulty; Able to read employee name badge without difficulty Corrective Lenses: Reading glasses Range of Motion: In [id number] AROM: Generally decreased, functional(except right hip post fx) Strength: In [address] Strength: Generally decreased, functional(except right hip post fx) Coordination: 
Coordination: Within functional limits Fine Motor Skills-Upper: Left Intact; Right Intact Gross Motor Skills-Upper: Left Intact; Right Intact Tone & Sensation: [place] [id number] Tone: Normal 
Sensation: Intact Balance: 
Sitting: Intact Standing: Impaired; With support Standing - Static: Good Standing - Dynamic : FairFunctional Mobility and Transfers for ADLs:Bed Mobility: Supine to Sit: Moderate assistance Sit to Supine: Minimum assistance Scooting: Minimum assistance; Moderate assistance Transfers: 
Sit to Stand: Moderate assistance Stand to Sit: Minimum assistance Bed to Chair: Contact guard assistance; Adaptive equipment; Additional time;Assist x1(Infer) Toilet Transfer : Contact guard assistance; Additional time; Adaptive equipment;Assist x1(to BSC, VCS for safety/hand placement) ADL Assessment: 
Feeding: Independent* Oral Facial Hygiene/Grooming: Independent* Bathing: Moderate assistance* Upper Body Dressing: Independent* Lower Body Dressing: Maximum assistance Toileting: Moderate assistance *Infer per obs of functional mobility, functional reach, BUE ROM, strength, balance and pain tolerance ADL Intervention and task modifications: 
 
Lower Body Dressing Assistance Socks: Maximum assistance Leg Crossed Method Used: No 
Position Performed: Seated edge of bed Cues: Don;Physical assistance;Verbal cues provided Toileting Bladder Hygiene: Contact guard assistance Clothing Management: Moderate assistance Cues: Verbal cues provided Adaptive Equipment: Walker(BSC) Cognitive Retraining Safety/Judgement: Awareness of environment; Fall prevention Functional Measure: 
Barthel Index: 
 
Bathin Bladder: 5 Bowels: 10 
Groomin Dressin Feeding: 10 Mobility: 5 Stairs: 0 Toilet Use: 5 Transfer (Bed to Chair and Back): 10 Total: 55 The Barthel ADL Index: Guidelines 1. The index should be used as a record of what a patient does, not as a record of what a patient could do. 2. The main aim is to establish degree of independence from any help, physical or verbal, however minor and for whatever reason. 3. The need for supervision renders the patient not independent. 4. A patient's performance should be established using the best available evidence.  Asking the patient, friends/relatives and nurses are the usual sources, but direct observation and common sense are also important. However direct testing is not needed. 5. Usually the patient's performance over the preceding 24-48 hours is important, but occasionally longer periods will be relevant. 6. Middle categories imply that the patient supplies over 50 per cent of the effort. 7. Use of aids to be independent is allowed. Allyson Dry., Barthel, D.W. (2034). Functional evaluation: the Barthel Index. [address] (14)2. Brittany Grier jackie KRYSTLE Bolton, Ama Schreiber., Damián Ann., [place], [address] (1999). Measuring the change indisability after inpatient rehabilitation; comparison of the responsiveness of the Barthel Index and Functional [place] Measure. Journal of Neurology, Neurosurgery, and Psychiatry, 66(4), 049-661. TYLER Shaw, DEBORAH Lund, & Adelita Adams, MConsueloA. (2004.) Assessment of post-stroke quality of life in cost-effectiveness studies: The usefulness of the Barthel Index and the EuroQoL-5D. [facility], 13, 687-99 Occupational Therapy Evaluation Charge Determination History Examination Decision-Making LOW Complexity : Brief history review  LOW Complexity : 1-3 performance deficits relating to physical, cognitive , or psychosocial skils that result in activity limitations and / or participation restrictions  MEDIUM Complexity : Patient may present with comorbidities that affect occupational performnce. Miniml to moderate modification of tasks or assistance (eg, physical or verbal ) with assesment(s) is necessary to enable patient to complete evaluation Based on the above components, the patient evaluation is determined to be of the following complexity level: LOW Pain: 
Pain Scale 1: Numeric (0 - 10) Pain Intensity 1: 4 Activity Tolerance:  
Good, VS (see flow sheet) Please refer to the flowsheet for vital signs taken during this treatment. After treatment: [] Patient left in no apparent distress sitting up in chair 
[x] Patient left in no apparent distress in bed 
[x] Call bell left within reach [x] Nursing notified 
[] Caregiver present 
[] Bed alarm activated COMMUNICATION/EDUCATION:  
The patients plan of care was discussed with: Physical Therapist and Registered Nurse. [x] Home safety education was provided and the patient/caregiver indicated understanding. [x] Patient/family have participated as able in goal setting and plan of care. [] Patient/family agree to work toward stated goals and plan of care. [] Patient understands intent and goals of therapy, but is neutral about his/her participation. [] Patient is unable to participate in goal setting and plan of care. This patients plan of care is appropriate for delegation to [facility]. Thank you for this referral. 
Dinesh Cain OT Time Calculation: 43 mins

## 2019-01-14 ENCOUNTER — HOME HEALTH ADMISSION (OUTPATIENT)
Dept: HOME HEALTH SERVICES | Facility: HOME HEALTH | Age: 74
End: 2019-01-14
Payer: MEDICARE

## 2019-01-14 LAB
ANION GAP SERPL CALC-SCNC: 8 MMOL/L (ref 5–15)
BUN SERPL-MCNC: 9 MG/DL (ref 6–20)
BUN/CREAT SERPL: 13 (ref 12–20)
CALCIUM SERPL-MCNC: 8.5 MG/DL (ref 8.5–10.1)
CHLORIDE SERPL-SCNC: 108 MMOL/L (ref 97–108)
CO2 SERPL-SCNC: 24 MMOL/L (ref 21–32)
CREAT SERPL-MCNC: 0.69 MG/DL (ref 0.55–1.02)
GLUCOSE SERPL-MCNC: 105 MG/DL (ref 65–100)
HGB BLD-MCNC: 8 G/DL (ref 11.5–16)
POTASSIUM SERPL-SCNC: 3.4 MMOL/L (ref 3.5–5.1)
SODIUM SERPL-SCNC: 140 MMOL/L (ref 136–145)

## 2019-01-14 PROCEDURE — 74011250637 HC RX REV CODE- 250/637: Performed by: PHYSICIAN ASSISTANT

## 2019-01-14 PROCEDURE — 74011250637 HC RX REV CODE- 250/637: Performed by: HOSPITALIST

## 2019-01-14 PROCEDURE — 97530 THERAPEUTIC ACTIVITIES: CPT

## 2019-01-14 PROCEDURE — 74011000250 HC RX REV CODE- 250: Performed by: PHYSICIAN ASSISTANT

## 2019-01-14 PROCEDURE — 74011250636 HC RX REV CODE- 250/636: Performed by: PHYSICIAN ASSISTANT

## 2019-01-14 PROCEDURE — 65270000029 HC RM PRIVATE

## 2019-01-14 PROCEDURE — 80048 BASIC METABOLIC PNL TOTAL CA: CPT

## 2019-01-14 PROCEDURE — 85018 HEMOGLOBIN: CPT

## 2019-01-14 PROCEDURE — 36415 COLL VENOUS BLD VENIPUNCTURE: CPT

## 2019-01-14 PROCEDURE — 97116 GAIT TRAINING THERAPY: CPT

## 2019-01-14 RX ADMIN — ATORVASTATIN CALCIUM 20 MG: 20 TABLET, FILM COATED ORAL at 09:40

## 2019-01-14 RX ADMIN — OXYCODONE HYDROCHLORIDE 5 MG: 5 TABLET ORAL at 23:35

## 2019-01-14 RX ADMIN — CALCIUM CARBONATE-VITAMIN D TAB 500 MG-200 UNIT 1 TABLET: 500-200 TAB at 19:26

## 2019-01-14 RX ADMIN — OXYCODONE HYDROCHLORIDE 5 MG: 5 TABLET ORAL at 07:57

## 2019-01-14 RX ADMIN — STANDARDIZED SENNA CONCENTRATE AND DOCUSATE SODIUM 1 TABLET: 8.6; 5 TABLET, FILM COATED ORAL at 19:22

## 2019-01-14 RX ADMIN — METOPROLOL SUCCINATE 12.5 MG: 25 TABLET, EXTENDED RELEASE ORAL at 09:40

## 2019-01-14 RX ADMIN — ACETAMINOPHEN 650 MG: 325 TABLET ORAL at 13:14

## 2019-01-14 RX ADMIN — ENOXAPARIN SODIUM 40 MG: 40 INJECTION SUBCUTANEOUS at 09:40

## 2019-01-14 RX ADMIN — POLYETHYLENE GLYCOL 3350 17 G: 17 POWDER, FOR SOLUTION ORAL at 09:40

## 2019-01-14 RX ADMIN — ACETAMINOPHEN 650 MG: 325 TABLET ORAL at 05:58

## 2019-01-14 RX ADMIN — OXYCODONE HYDROCHLORIDE 5 MG: 5 TABLET ORAL at 19:22

## 2019-01-14 RX ADMIN — POTASSIUM CHLORIDE 40 MEQ: 1.5 POWDER, FOR SOLUTION ORAL at 19:26

## 2019-01-14 RX ADMIN — ACETAMINOPHEN 650 MG: 325 TABLET ORAL at 19:22

## 2019-01-14 RX ADMIN — STANDARDIZED SENNA CONCENTRATE AND DOCUSATE SODIUM 1 TABLET: 8.6; 5 TABLET, FILM COATED ORAL at 09:40

## 2019-01-14 RX ADMIN — CALCIUM CARBONATE-VITAMIN D TAB 500 MG-200 UNIT 1 TABLET: 500-200 TAB at 13:15

## 2019-01-14 RX ADMIN — ASPIRIN 81 MG CHEWABLE TABLET 81 MG: 81 TABLET CHEWABLE at 09:39

## 2019-01-14 RX ADMIN — PANTOPRAZOLE SODIUM 40 MG: 40 TABLET, DELAYED RELEASE ORAL at 09:40

## 2019-01-14 RX ADMIN — CALCIUM CARBONATE-VITAMIN D TAB 500 MG-200 UNIT 1 TABLET: 500-200 TAB at 07:57

## 2019-01-14 RX ADMIN — Medication 10 ML: at 03:13

## 2019-01-14 RX ADMIN — Medication 10 ML: at 05:57

## 2019-01-14 RX ADMIN — POTASSIUM CHLORIDE 40 MEQ: 1.5 POWDER, FOR SOLUTION ORAL at 07:58

## 2019-01-14 RX ADMIN — OXYCODONE HYDROCHLORIDE 5 MG: 5 TABLET ORAL at 03:12

## 2019-01-14 NOTE — PROGRESS NOTES
Problem: Mobility Impaired (Adult and Pediatric) Goal: *Acute Goals and Plan of Care (Insert Text) Physical Therapy Goals Initiated 1/13/2019 1. Patient will move from supine to sit and sit to supine  in bed with supervision/set-up within 4 days. 2. Patient will perform sit to stand with modified independence within 4 days. 3. Patient will ambulate with supervision/set-up for 200 feet with the least restrictive device within 4 days. 4. Patient will ascend/descend 3 stairs with 1 handrail(s) with modified independence within 4 days. 5. Patient will perform hip home exercise program per protocol with modified independence within 4 days. physical Therapy TREATMENT Patient: Tadeo Hernandez (79 y.o. female) Date: 1/14/2019 Diagnosis: Hip fracture ([address] Hip fracture ([address] Procedure(s) (LRB): 
RIGHT FEMUR INSERTION INTRA MEDULLARY NAIL (Right) 2 Days Post-Op Precautions: Fall Chart, physical therapy assessment, plan of care and goals were reviewed. ASSESSMENT: 
Pt received supine in bed and agreeable to working with therapy. Continues to struggle with bed mobility requiring Mod A d/t pain and stiffness. Stood to [employer] with Min A and ambulated 20ft with minimal fatigue. Progressed to step through though more painful with increased step length. D/t fatigue utilized wheelchair to transport to stairs. CGA to ascend/descend 3 steps with bilateral railings and verbal cueing to sequence steps. Pt tolerated stair training well however upon finishing steps c/o nausea and BP 71/43 and symptomatic. Reclined w/c and rolled back to room. Min A to transfer back to bed where pressures slowly recovered 104/78(3 minutes) and 135/78(5 minutes). Left supine in bed with all needs in reach. Pt hopeful to return home with family assist and HHPT/OT. Progression toward goals: 
[x]      Improving appropriately and progressing toward goals 
[]      Improving slowly and progressing toward goals []      Not making progress toward goals and plan of care will be adjusted PLAN: 
Patient continues to benefit from skilled intervention to address the above impairments. Continue treatment per established plan of care. Discharge Recommendations:  Home Health Further Equipment Recommendations for Discharge:  None SUBJECTIVE:  
Patient stated I think I am going to throw up.  OBJECTIVE DATA SUMMARY:  
Critical Behavior: 
Neurologic State: Alert Orientation Level: Appropriate for age, Oriented X4 Cognition: Appropriate decision making, Appropriate for age attention/concentration, Appropriate safety awareness, Follows commands Safety/Judgement: Awareness of environment, Fall prevention Functional Mobility Training: 
Bed Mobility: 
  
Supine to Sit: Moderate assistance Transfers: 
Sit to Stand: Minimum assistance Stand to Sit: Minimum assistance Balance: 
Sitting: Intact Standing: Impaired Standing - Static: Good Standing - Dynamic : FairAmbulation/Gait Training: 
Distance (ft): 30 Feet (ft) Assistive Device: Gait belt;Walker, rolling Ambulation - Level of Assistance: Minimal assistance Gait Abnormalities: Decreased step clearance Base of Support: Widened Stance: Right decreased Speed/Antonette: Pace decreased (<100 feet/min) Step Length: Left shortened Swing Pattern: Right asymmetrical 
  
  
  
  
  
Stairs: 
Number of Stairs Trained: 3 Stairs - Level of Assistance: Contact guard assistance Rail Use: Both 
 
 
Pain: 
Pain Scale 1: Numeric (0 - 10) Pain Intensity 1: 6 Pain Location 1: Hip Pain Orientation 1: Right Pain Description 1: Throbbing Pain Intervention(s) 1: Medication (see MAR) Activity Tolerance:  
Good Please refer to the flowsheet for vital signs taken during this treatment. After treatment:  
[] Patient left in no apparent distress sitting up in chair 
[x] Patient left in no apparent distress in bed [x] Call bell left within reach [x] Nursing notified 
[] Caregiver present 
[] Bed alarm activated COMMUNICATION/COLLABORATION:  
The patients plan of care was discussed with: Registered Nurse Hilaria Banks, PT Time Calculation: 28 mins

## 2019-01-14 NOTE — PROGRESS NOTES
Chart reviewed. Noted that referrals were sent to [facility] and [facility] over the weekend by previous CM. CM spoke with Naila Juarez with [facility], they have accepted patient if the plan is for home. CM spoke with William Moura with [facility] ([phone]), they are reviewing referral . CM will continue to follow. Received call from Benton Lopez, liaison with [address] [place] [address]. They have accepted this patient. CM met with patient to find out if she prefers rehab [facility]. Patient would like to go home with home health. CM notified Benton Lopez with [facility]. Patient is aware that [facility] has accepted her. Daughter will transport patient home via car at discharge. Spoke with MD, plan is for discharge home tomorrow. [facility] will need specific home health orders written prior to discharge. CM left FYI. Tana July, BSW/CRM

## 2019-01-14 NOTE — PROGRESS NOTES
Chart reviewed and patient reported she had just returned to bed and worked with physical therapy prior. Patient requesting to rest at this time and work with OT tomorrow. Will follow up for OT intervention as able and appropriate. Thank you.

## 2019-01-14 NOTE — PROGRESS NOTES
Problem: Mobility Impaired (Adult and Pediatric) Goal: *Acute Goals and Plan of Care (Insert Text) Physical Therapy Goals Initiated 1/13/2019 1. Patient will move from supine to sit and sit to supine  in bed with supervision/set-up within 4 days. 2. Patient will perform sit to stand with modified independence within 4 days. 3. Patient will ambulate with supervision/set-up for 200 feet with the least restrictive device within 4 days. 4. Patient will ascend/descend 3 stairs with 1 handrail(s) with modified independence within 4 days. 5. Patient will perform hip home exercise program per protocol with modified independence within 4 days. physical Therapy TREATMENT Patient: Brett Tenorio (89 y.o. female) Date: 1/14/2019 Diagnosis: Hip fracture ([address] Hip fracture ([address] Procedure(s) (LRB): 
RIGHT FEMUR INSERTION INTRA MEDULLARY NAIL (Right) 2 Days Post-Op Precautions: Fall Chart, physical therapy assessment, plan of care and goals were reviewed. ASSESSMENT: 
Patient approached sitting in chair. Sit<>stand transfers performed with SBA, good remembrance of proper technique. Gait training 48' with rolling walker and SBA, no reports of dizziness, just pain and fatigue. BP decreased post-activity. PT provided encouragement in order for patient to walk further today in anticipation for discharge home with family. Encouraged fluid and PO intake to maintain BP. Progression toward goals: 
[x]      Improving appropriately and progressing toward goals 
[]      Improving slowly and progressing toward goals 
[]      Not making progress toward goals and plan of care will be adjusted PLAN: 
Patient continues to benefit from skilled intervention to address the above impairments. Continue treatment per established plan of care. Discharge Recommendations:  Home Health Further Equipment Recommendations for Discharge: walker SUBJECTIVE:  
Patient stated Did they tell you I got dizzy this morning? Birdie Luna OBJECTIVE DATA SUMMARY:  
Critical Behavior: 
Neurologic State: Alert Orientation Level: Appropriate for age, Oriented X4 Cognition: Appropriate decision making, Appropriate for age attention/concentration, Appropriate safety awareness, Follows commands Safety/Judgement: Awareness of environment, Fall prevention Functional Mobility Training: 
Bed Mobility: 
  
Supine to Sit: Moderate assistance Transfers: 
Sit to Stand: Stand-by assistance Stand to Sit: Stand-by assistance Balance: 
Sitting: Intact Standing: Impaired Standing - Static: Good Standing - Dynamic : FairAmbulation/Gait Training: 
Distance (ft): 50 Feet (ft) Assistive Device: Gait belt;Walker, rolling Ambulation - Level of Assistance: Stand-by assistance Gait Description (WDL): Exceptions to [facility] Gait Abnormalities: Antalgic;Decreased step clearance Right Side Weight Bearing: Partial (%)(50% WB on RLE) Base of Support: Widened Stance: Right decreased Speed/Antonette: Pace decreased (<100 feet/min) Step Length: Left shortened Swing Pattern: Right asymmetrical 
  
  
  
 Stairs: performed during AM session Number of Stairs Trained: 3 Stairs - Level of Assistance: Contact guard assistance Rail Use: Both Therapeutic Exercises:  
 
Pain: 
Pain Scale 1: Numeric (0 - 10) Pain Intensity 1: 6 Pain Location 1: Hip Pain Orientation 1: Right Pain Description 1: Throbbing Pain Intervention(s) 1: Medication (see MAR) Activity Tolerance:  
BP decreased pre and post activity Please refer to the flowsheet for vital signs taken during this treatment. After treatment:  
[x] Patient left in no apparent distress sitting up in chair 
[] Patient left in no apparent distress in bed 
[x] Call bell left within reach [x] Nursing notified 
[] Caregiver present 
[] Bed alarm activated COMMUNICATION/COLLABORATION:  
The patients plan of care was discussed with: Registered Nurse Cammie Mccarthy, PT, DPT Time Calculation: 22 mins

## 2019-01-14 NOTE — PROGRESS NOTES
Hospitalist Progress Note Priscilla Pineda MD 
Answering service: [phone] OR [phone] from in house phone Date of Service:  2019 NAME:  Adriano Durand :  1945 MRN:  [id number] Admission Summary:  
68 y.o F with PMH of HTN,HLD came to the hospital because of a fall. She was found to have rt hip fracture,admitted for further management. Interval history / Subjective:  
 
Patient complained of pain on the medial rt thigh. Did not have a bowel movement yet. Assessment & Plan: #Displaced intertochanteric fracture of rt femur: 
-X ray -acute mildly displaced intertrochanteric fracture of the proximal right femur. . 
-ortho following. 
-s/p intramedullary fixation on 2019 
-pain management per surgery team 
-bowel regimen. 
-hb drop due to intra Op losses -Hb stable 11-> 8. 
-PT/OT recommends home with home health 
-will need DVT prophylaxis recommendations from ortho team 
 
 
#HTN: 
-will continue metoprolol. 
- Hold hctz as BP soft #Hypokalemia: replete. #CKD stage 2: monitor creatinine #Leukocytosis:resolved -stress related Code status: full DVT prophylaxis: lovenox Care Plan discussed with: Patient/Family and Nurse Disposition: Family wants to take her home tomorrow -they are making arrangements at home today. Hospital Problems  Date Reviewed: 2018 Codes Class Noted POA * (Principal) Hip fracture ([facility] [address] ICD-10-CM: U35.440D ICD-9-CM: 820.8  2019 Yes Review of Systems: As per HPI Vital Signs:  
 Last 24hrs VS reviewed since prior progress note. Most recent are: 
Visit Vitals /66 Pulse 83 Temp 98 °F (36.7 °C) Resp 16 Ht 5' 3\" (1.6 m) Wt 75.2 kg (165 lb 12.6 oz) SpO2 96% BMI 29.37 kg/m² Intake/Output Summary (Last 24 hours) at 2019 1047 Last data filed at 2019 1647 Gross per 24 hour Intake  Output 800 ml Net -800 ml Physical Examination:  
 
 
     
Constitutional:  No acute distress, cooperative, pleasant   
ENT:  Oral mucous moist, oropharynx benign. Neck supple, Resp:  CTA bilaterally. No wheezing/rhonchi/rales. No accessory muscle use CV:  Regular rhythm, normal rate, no murmurs, gallops, rubs GI:  Soft, non distended, non tender. normoactive bowel sounds, no hepatosplenomegaly Musculoskeletal:  was able to move the LLE,surgical site dressing intact Neurologic:  no focal deficits Data Review:  
 Review and/or order of clinical lab test 
Review and/or order of tests in the medicine section of [facility] Labs:  
 
Recent Labs  
  01/14/19 0319 01/13/19 
0649 01/12/19 
0300 01/11/19 
1804 WBC  --   --  8.0 15.9* HGB 8.0* 8.1* 11.2* 12.3 HCT  --   --  34.9* 37.9 PLT  --   --  187 194 Recent Labs  
  01/14/19 0319 01/13/19 0649 01/12/19 0300 01/11/19 
1800  139 135*   < >  --   
K 3.4* 3.4* 3.7   < >  --   
 109* 103   < >  --   
CO2 24 23 23   < >  --   
BUN 9 7 13   < >  --   
CREA 0.69 0.75 1.07*   < > 1.06* * 113* 143*   < >  --   
CA 8.5 7.7* 8.9   < >  --   
MG  --   --  2.1  --  2.0 PHOS  --   --  3.2  --   --   
 < > = values in this interval not displayed. Recent Labs  
  01/12/19 0300 01/11/19 
1804 SGOT 27 23 ALT 22 22 AP 66 74 TBILI 0.7 0.6 TP 6.9 7.1 ALB 3.2* 3.6 GLOB 3.7 3.5 Recent Labs  
  01/11/19 
1800 INR 1.0 PTP 9.8 APTT 21.7* No results for input(s): FE, TIBC, PSAT, FERR in the last 72 hours. No results found for: FOL, RBCF No results for input(s): PH, PCO2, PO2 in the last 72 hours. No results for input(s): CPK, CKNDX, TROIQ in the last 72 hours. No lab exists for component: CPKMB No results found for: CHOL, CHOLX, CHLST, CHOLV, HDL, LDL, LDLC, DLDLP, TGLX, TRIGL, TRIGP, CHHD, CHHDX No results found for: Taylor Benz Lab Results Component Value Date/Time Color YELLOW/STRAW 01/11/2019 07:00 PM  
 Appearance CLEAR 01/11/2019 07:00 PM  
 Specific gravity 1.025 01/11/2019 07:00 PM  
 pH (UA) 6.0 01/11/2019 07:00 PM  
 Protein NEGATIVE  01/11/2019 07:00 PM  
 Glucose NEGATIVE  01/11/2019 07:00 PM  
 Ketone 15 (A) 01/11/2019 07:00 PM  
 Bilirubin NEGATIVE  01/11/2019 07:00 PM  
 Urobilinogen 0.2 01/11/2019 07:00 PM  
 Nitrites NEGATIVE  01/11/2019 07:00 PM  
 Leukocyte Esterase NEGATIVE  01/11/2019 07:00 PM  
 Epithelial cells MODERATE (A) 01/11/2019 07:00 PM  
 Bacteria NEGATIVE  01/11/2019 07:00 PM  
 WBC 0-4 01/11/2019 07:00 PM  
 RBC 0-5 01/11/2019 07:00 PM  
 
 
 
Medications Reviewed:  
 
Current Facility-Administered Medications Medication Dose Route Frequency  aspirin chewable tablet 81 mg  81 mg Oral DAILY  sodium chloride (NS) flush 5-40 mL  5-40 mL IntraVENous Q8H  
 sodium chloride (NS) flush 5-40 mL  5-40 mL IntraVENous PRN  
 acetaminophen (TYLENOL) tablet 650 mg  650 mg Oral Q6H  
 oxyCODONE IR (ROXICODONE) tablet 2.5 mg  2.5 mg Oral Q4H PRN  
 naloxone (NARCAN) injection 0.4 mg  0.4 mg IntraVENous PRN  
 ondansetron (ZOFRAN ODT) tablet 4 mg  4 mg Oral Q6H PRN  
 calcium-vitamin D (OS-LINDA) 500 mg-200 unit tablet  1 Tab Oral TID WITH MEALS  senna-docusate (PERICOLACE) 8.6-50 mg per tablet 1 Tab  1 Tab Oral BID  polyethylene glycol (MIRALAX) packet 17 g  17 g Oral DAILY  bisacodyl (DULCOLAX) suppository 10 mg  10 mg Rectal DAILY PRN  
 enoxaparin (LOVENOX) injection 40 mg  40 mg SubCUTAneous DAILY  sodium chloride (NS) flush 5-40 mL  5-40 mL IntraVENous Q8H  
 sodium chloride (NS) flush 5-40 mL  5-40 mL IntraVENous PRN  
 naloxone (NARCAN) injection 0.4 mg  0.4 mg IntraVENous PRN  
 atorvastatin (LIPITOR) tablet 20 mg  20 mg Oral DAILY  metoprolol succinate (TOPROL-XL) XL tablet 12.5 mg  12.5 mg Oral DAILY  pantoprazole (PROTONIX) tablet 40 mg  40 mg Oral DAILY  fentaNYL citrate (PF) injection 25 mcg  25 mcg IntraVENous Q4H PRN  
 fentaNYL citrate (PF) injection 50 mcg  50 mcg IntraVENous Q4H PRN  potassium chloride (KLOR-CON) packet for solution 40 mEq  40 mEq Oral BID WITH MEALS  cyclobenzaprine (FLEXERIL) tablet 5 mg  5 mg Oral TID PRN  
 oxyCODONE IR (ROXICODONE) tablet 5 mg  5 mg Oral Q4H PRN  
 
______________________________________________________________________ EXPECTED LENGTH OF STAY: 3d 12h ACTUAL LENGTH OF STAY:          3 Samir Huff MD

## 2019-01-14 NOTE — PROGRESS NOTES
Ortho Daily Progress Note Patient: Becca Byrd                   MRN: [id number]  Sex: female YOB: 1945           Age: 68 y.o. 
 
2 Days Post-Op Procedure(s): RIGHT FEMUR INSERTION INTRA MEDULLARY NAIL Subjective: Did well with therapy, reports some lightheadedness Visit Vitals /66 Pulse 83 Temp 98 °F (36.7 °C) Resp 16 Ht 5' 3\" (1.6 m) Wt 75.2 kg (165 lb 12.6 oz) SpO2 96% BMI 29.37 kg/m² Lab Results: HGB Date/Time Value Ref Range Status 01/14/2019 03:19 AM 8.0 (L) 11.5 - 16.0 g/dL Final  
 
INR Date/Time Value Ref Range Status 01/11/2019 06:00 PM 1.0 0.9 - 1.1   Final  
  Comment:  
  A single therapeutic range for Vit K antagonists may not be optimal for all indications - see June, 2008 issue of Chest, American College of Chest Physicians Evidence-Based Clinical Practice Guidelines, 8th Edition. Physical Exam: 
 
GENERAL: alert, cooperative, no distress, appears stated age DRESSING: clean/dry SWELLING: mild NEUROLOGICAL: intact PULSE:yes MOTION: normal ROM right hip w/o pain DVT Exam:No evidence of DVT seen on physical exam. 
 
 
Plan: DVT prophylaxisLovenox Weight bearing restrictionWBAT Pain Control:stable, mild-to-moderate joint symptoms intermittently, reasonably well controlled by current meds [address] [place] 
1/14/2019  
12:37 PM 
 
 
.

## 2019-01-14 NOTE — PROGRESS NOTES
NUTRITION COMPLETE ASSESSMENT 
 
RECOMMENDATIONS:  
1. Regular(RD change diet to regular) 2. RD add Ensure Enlive (chocolate) x 1 day 3. Encourage fluids and fiber to promote BM Interventions/Plan:  
Food/Nutrient Delivery:  General/healthful diet(Pt unhappy with cardiac diet) Commercial supplement(Ensure Enlive (dominique)) Nutrition Education:    
Coordination of Care:   
Nutrition Counseling:     
 
Assessment:  
Reason for Assessment:  
[x] Provider Consult: Geriatric hip fx Diet: Cardiac(Pt requests regular diet) Supplements: Ensure Enlive (dominique) x 1 day Nutritionally Significant Medications: [x] Reviewed & Includes: Lipitor, Oscal D, Protonix, Miralax, Pericolace Meal Intake: No data found. Subjective: -163#; appetite good before I came to hospital, fair now. Nothing tastes good. Daughter put salt on my lunch (cardiac diet at lunch); Natural teeth, chew/swallow ok. Objective: Hx HL, Rx Lipitor; HTN; CKD2; Admit R hip fx with surgical nail repair. Admit wt ~165# stable compared to UBW  162-163#. No recent wt changes. Although usual appetite good, current appetite fair-poor. RD to add one Ensure Enlive (dominique) daily, and likes yogurt. No difficulty chew/swallow. , no hx DM. Last BM 1/11/19 and c/o constipation. Rx Miralax and Pericolace. Encouraged fluids and fiber; offered prune juice. Admit diet cardiac, but pt unhappy with diet, she and daughter claim doesn't follow a diet at home. Daughter added salt at lunch which was restricted with cardiac diet. Estimated Nutrition Needs:  
Kcals/day: 1800 Kcals/day Protein: 90 g(~1.2 gm/kg) Fluid: 1800 ml(~1 mL/kcal) Based On: Nivia Fenton(MSJ x 1.3) Weight Used: Actual wt(75.2 kg) Pt expected to meet estimated nutrient needs:  [x]   Yes with diet liberalization and adding one ONS/daily Comparative Standards:  ;  ;   
 
Nutrition Diagnosis: 1. Inadequate oral intake related to appetite change on hospitalization as evidenced by fair-poor appetite post-op Goals:   
 Consume 100% Ensure Enlive daily until PO % all meals and snacks. Monitoring & Evaluation: - Total energy intake, Protein intake, Oral fluids amount, Fiber intake - Lean body mass, fat free mass, Weight/weight change, GI 
 
Previous Nutrition Goals Met: N/A Previous Recommendations: N/A Education & Discharge Needs: 
 [x] Identified and addressed: Reviewed protein needs for healing; fiber/fluids for BM [x] Participated in care plan, discharge planning, and/or interdisciplinary rounds Cultural, Bahai and ethnic food preferences identified: 
 None Skin Integrity: [x]Intact, incision; PI prevention Edema: [x]None []Other Last BM: 1/11/19 Food Allergies: [x]NKA Anthropometrics:   
Weight Loss Metrics 1/13/2019 2/12/2018 11/11/2010 Today's Wt 165 lb 12.6 oz 160 lb 160 lb BMI 29.37 kg/m2 28.34 kg/m2 28.35 kg/m2 Last 3 Recorded Weights in this Encounter 01/11/19 1741 01/13/19 0452 Weight: 75.2 kg (165 lb 12.6 oz) 75.2 kg (165 lb 12.6 oz) Weight Source: Bed Height: 5' 3\" (160 cm), Height Source: (Prior encounter 2/2018) Body mass index is 29.37 kg/m². IBW : 52.2 kg (115 lb), Usual Body Weight: 73.9 kg (163 lb),   
 
Labs:   
Lab Results Component Value Date/Time Sodium 140 01/14/2019 03:19 AM  
 Potassium 3.4 (L) 01/14/2019 03:19 AM  
 Chloride 108 01/14/2019 03:19 AM  
 CO2 24 01/14/2019 03:19 AM  
 Glucose 105 (H) 01/14/2019 03:19 AM  
 BUN 9 01/14/2019 03:19 AM  
 Creatinine 0.69 01/14/2019 03:19 AM  
 Calcium 8.5 01/14/2019 03:19 AM  
 Magnesium 2.1 01/12/2019 03:00 AM  
 Phosphorus 3.2 01/12/2019 03:00 AM  
 Albumin 3.2 (L) 01/12/2019 03:00 AM  
 
No results found for: HBA1C, HGBE8, IWZ9LNVJ, NSX5IOPI Lab Results Component Value Date/Time Glucose 105 (H) 01/14/2019 03:19 AM  
  
Lab Results Component Value Date/Time ALT (SGPT) 22 01/12/2019 03:00 AM  
 AST (SGOT) 27 01/12/2019 03:00 AM  
 Alk.  phosphatase 66 01/12/2019 03:00 AM  
 Bilirubin, total 0.7 01/12/2019 03:00 AM  
  
 
De Mclean RD

## 2019-01-15 VITALS
WEIGHT: 171.4 LBS | TEMPERATURE: 98.4 F | DIASTOLIC BLOOD PRESSURE: 72 MMHG | OXYGEN SATURATION: 98 % | SYSTOLIC BLOOD PRESSURE: 133 MMHG | RESPIRATION RATE: 16 BRPM | HEART RATE: 88 BPM | BODY MASS INDEX: 30.37 KG/M2 | HEIGHT: 63 IN

## 2019-01-15 PROCEDURE — 97116 GAIT TRAINING THERAPY: CPT

## 2019-01-15 PROCEDURE — 97530 THERAPEUTIC ACTIVITIES: CPT

## 2019-01-15 PROCEDURE — 74011250637 HC RX REV CODE- 250/637: Performed by: PHYSICIAN ASSISTANT

## 2019-01-15 PROCEDURE — 74011250636 HC RX REV CODE- 250/636: Performed by: PHYSICIAN ASSISTANT

## 2019-01-15 PROCEDURE — 74011000250 HC RX REV CODE- 250: Performed by: PHYSICIAN ASSISTANT

## 2019-01-15 PROCEDURE — 74011250637 HC RX REV CODE- 250/637: Performed by: HOSPITALIST

## 2019-01-15 PROCEDURE — 97535 SELF CARE MNGMENT TRAINING: CPT

## 2019-01-15 RX ORDER — GUAIFENESIN 100 MG/5ML
81 LIQUID (ML) ORAL DAILY
Qty: 30 TAB | Refills: 0 | Status: SHIPPED | OUTPATIENT
Start: 2019-02-10

## 2019-01-15 RX ORDER — OXYCODONE HYDROCHLORIDE 5 MG/1
5 TABLET ORAL
Qty: 20 TAB | Refills: 0 | Status: SHIPPED | OUTPATIENT
Start: 2019-01-15

## 2019-01-15 RX ORDER — ENOXAPARIN SODIUM 100 MG/ML
40 INJECTION SUBCUTANEOUS DAILY
Qty: 11 SYRINGE | Refills: 0 | Status: SHIPPED | OUTPATIENT
Start: 2019-01-16

## 2019-01-15 RX ORDER — FERROUS SULFATE, DRIED 160(50) MG
1 TABLET, EXTENDED RELEASE ORAL
Qty: 90 TAB | Refills: 0 | Status: SHIPPED | OUTPATIENT
Start: 2019-01-15

## 2019-01-15 RX ORDER — POLYETHYLENE GLYCOL 3350 17 G/17G
17 POWDER, FOR SOLUTION ORAL DAILY
Qty: 30 PACKET | Refills: 0 | Status: SHIPPED | OUTPATIENT
Start: 2019-01-16

## 2019-01-15 RX ORDER — AMOXICILLIN 250 MG
1 CAPSULE ORAL 2 TIMES DAILY
Qty: 60 TAB | Refills: 0 | Status: SHIPPED | OUTPATIENT
Start: 2019-01-15

## 2019-01-15 RX ORDER — GUAIFENESIN 100 MG/5ML
81 LIQUID (ML) ORAL 2 TIMES DAILY
Qty: 28 TAB | Refills: 0 | Status: SHIPPED | OUTPATIENT
Start: 2019-01-27 | End: 2019-02-10

## 2019-01-15 RX ORDER — POTASSIUM CHLORIDE 20 MEQ/1
20 TABLET, EXTENDED RELEASE ORAL 2 TIMES DAILY
Qty: 60 TAB | Refills: 0 | Status: SHIPPED | OUTPATIENT
Start: 2019-01-15

## 2019-01-15 RX ADMIN — OXYCODONE HYDROCHLORIDE 5 MG: 5 TABLET ORAL at 08:38

## 2019-01-15 RX ADMIN — PANTOPRAZOLE SODIUM 40 MG: 40 TABLET, DELAYED RELEASE ORAL at 08:38

## 2019-01-15 RX ADMIN — ENOXAPARIN SODIUM 40 MG: 40 INJECTION SUBCUTANEOUS at 08:39

## 2019-01-15 RX ADMIN — STANDARDIZED SENNA CONCENTRATE AND DOCUSATE SODIUM 1 TABLET: 8.6; 5 TABLET, FILM COATED ORAL at 08:38

## 2019-01-15 RX ADMIN — ATORVASTATIN CALCIUM 20 MG: 20 TABLET, FILM COATED ORAL at 08:38

## 2019-01-15 RX ADMIN — POTASSIUM CHLORIDE 40 MEQ: 1.5 POWDER, FOR SOLUTION ORAL at 08:38

## 2019-01-15 RX ADMIN — CALCIUM CARBONATE-VITAMIN D TAB 500 MG-200 UNIT 1 TABLET: 500-200 TAB at 08:38

## 2019-01-15 RX ADMIN — ACETAMINOPHEN 650 MG: 325 TABLET ORAL at 06:52

## 2019-01-15 RX ADMIN — METOPROLOL SUCCINATE 12.5 MG: 25 TABLET, EXTENDED RELEASE ORAL at 08:38

## 2019-01-15 RX ADMIN — POLYETHYLENE GLYCOL 3350 17 G: 17 POWDER, FOR SOLUTION ORAL at 09:00

## 2019-01-15 RX ADMIN — ASPIRIN 81 MG CHEWABLE TABLET 81 MG: 81 TABLET CHEWABLE at 08:38

## 2019-01-15 RX ADMIN — ACETAMINOPHEN 650 MG: 325 TABLET ORAL at 02:40

## 2019-01-15 NOTE — DISCHARGE INSTRUCTIONS
Discharge Instructions     PATIENT ID: Harvey Jones  MRN: [id number]   YOB: 1945    DATE OF ADMISSION: 1/11/2019  5:33 PM    DATE OF DISCHARGE: 1/15/2019  PRIMARY CARE PROVIDER: Lucy Muhammad MD   ATTENDING PHYSICIAN: Mohan Barnes MD  DISCHARGING PROVIDER: Keyshawn Mcdaniel NP. To contact this individual call [phone] and ask the  to page. If unavailable ask to be transferred the Adult Hospitalist Department. DISCHARGE DIAGNOSES   R hip fracture  Hypokalemia  Hx Hypertension    CONSULTATIONS: IP CONSULT TO HOSPITALIST  IP CONSULT TO ORTHOPEDIC SURGERY    PROCEDURES/SURGERIES: Procedure(s):  RIGHT FEMUR INSERTION INTRA MEDULLARY NAIL    FOLLOW UP APPOINTMENTS:   Follow-up Information     Follow up With Specialties Details Why Contact Info    Lucy Muhammad MD Family Practice In 1 week make an appt in 1-2 weeks, blood pressure and lab check [address]      Mark Correia MD Orthopedic Surgery In 3 weeks  [address] [place] [id number]  [facility] [address] [facility]  [facility] [id number] [phone]         ADDITIONAL CARE RECOMMENDATIONS:   - Please check blood pressure daily and keep record. - Restart Hydrochlorothiazide next week (Monday 1/21) and continue to monitor blood pressures. - we will CONTINUE potassium on discharge as potassium levels are running on low side    - Suggest contacting your PCP for a post hospital check up for labs (check potassium) and BP check    Activity   Walk with your walker: Continue Physical therapy, partial wt bearing, flat foot gait pattern. Continue using your walker until seen for follow-up visit.      Practice your exercises 3 times a day as instructed by the physical therapist.  [facility] Get up frequently and walk (with assistance as needed)   You may not drive     Incision Care   The [facility] (brown, waterproof) surgical dressing is to remain on the hip for 7 days. On the 7th day gently peel the dressing off by carefully lifting the edge and stretching it slightly to break the adhesive seal   If your Aquacel dressing comes loose/off before the 7th day, you may replace it with a dry sterile gauze dressing; change it daily. Once the incision is not draining, leave it open to air   You may take a shower with the Aquacel dressing in place. Once the Aquacel is removed,  may shower and get your incision wet but do not submerge your incision under water in a bath tub, hot tub or swimming pool for 6 weeks after surgery. Preventing blood clots   Take Lovenox for the next 11 days (1/16 through 1/26) and then take 81 mg baby aspirin twice daily (1/27-2/9) and then you may resume your usual dose of baby aspirin daily.  Wear elastic stockings (TEDS) for 4 weeks. Remove them for approximately 1 hour daily for showering/sponge bathing    Pain management   Take pain medication as prescribed; decrease the amount you take as your pain lessens. Suggest 650 mg Tylenol every 6 hours OR 1000 mg Tylenol every 8 hours is suggested for the next few weeks. Also will have a prescription for Oxycodone 5 mg every 4-6 hours for severe pain   Avoid alcoholic beverages while taking pain medications   Place an ice bag on the hip for 15-20 minutes after exercising and as needed throughout the day and night    Diet   Resume usual diet; encourage fluids; eat foods high in fiber, calcium and vitamin D.   You may want to take a stool softener (such as Senokot-S or Colace) to prevent constipation while you are taking pain medication.  If constipation occurs, take a laxative (such as Dulcolax tablets, Miralax, or a suppository)    When to call your Orthopaedic Surgeon.  If you call after 5pm or on a weekend, the on call physician will be contacted   Pain that is not relieved by pain medication, ice, activity   Signs of infection  o Incision is reddened  o Incision continues to drain; drainage has an odor  o Persistent fever over 101 degrees   Signs of a blood clot in your leg  o Calf pain, tenderness, redness and/or swelling of lower leg    When to call your Primary Care Physician   Concerns about medical conditions such as diabetes, high blood pressure, asthma, congestive heart failure   Call if blood sugars are elevated, persistent headache or dizziness, coughing or congestion, constipation or diarrhea, burning with urination, abnormal heart rate (slow or fast)    When to call 911 and go to the nearest emergency room   Acute onset of chest pain, shortness of breath, difficulty breathing    · It is important that you take the medication exactly as they are prescribed. · Keep your medication in the bottles provided by the pharmacist and keep a list of the medication names, dosages, and times to be taken in your wallet. · Do not take other medications without consulting your doctor. NOTIFY YOUR PHYSICIAN FOR ANY OF THE FOLLOWING:   Fever over 101 degrees for 24 hours. Chest pain, shortness of breath, fever, chills, nausea, vomiting, diarrhea, change in mentation, falling, weakness, bleeding. Severe pain or pain not relieved by medications. Or, any other signs or symptoms that you may have questions about.     DISPOSITION:   xx Home With:  xx OT xx PT xx HH  RN       SNF/Inpatient Rehab/LTAC    Independent/assisted living    Hospice    Other:     CDMP Checked:   Yes *x*     PROBLEM LIST Updated:  Yes *x*     Signed:   Regine Randall NP  1/15/2019  10:03 AM

## 2019-01-15 NOTE — PROGRESS NOTES
Problem: Mobility Impaired (Adult and Pediatric) Goal: *Acute Goals and Plan of Care (Insert Text) Physical Therapy Goals Initiated 1/13/2019 1. Patient will move from supine to sit and sit to supine  in bed with supervision/set-up within 4 days. 2. Patient will perform sit to stand with modified independence within 4 days. 3. Patient will ambulate with supervision/set-up for 200 feet with the least restrictive device within 4 days. 4. Patient will ascend/descend 3 stairs with 1 handrail(s) with modified independence within 4 days. 5. Patient will perform hip home exercise program per protocol with modified independence within 4 days. physical Therapy TREATMENT Patient: Clive Denise (63 y.o. female) Date: 1/15/2019 Diagnosis: Hip fracture ([address] Hip fracture ([address] Procedure(s) (LRB): 
RIGHT FEMUR INSERTION INTRA MEDULLARY NAIL (Right) 3 Days Post-Op Precautions: Fall Chart, physical therapy assessment, plan of care and goals were reviewed. ASSESSMENT: 
Pt received supine in bed and eager to mobilize. Reports she just returned from the [facility]. Improved bed mobility at a Min A level with additional time and heavy use of bed rails. Pt is overall CGA-SBA for transfers with good safety awareness displayed. Ambulated 79 ft with decreased step clearance and flat foot strike on the R. Minimal foot clearance until verbal cues provided to flex knee. Pt fatigued by effort but no c/o n/v or dizziness. Left up in chair with stable vitals at end of session. Plan for discharge with HHPT remains appropriate. Progression toward goals: 
[x]    Improving appropriately and progressing toward goals 
[]    Improving slowly and progressing toward goals 
[]    Not making progress toward goals and plan of care will be adjusted PLAN: 
Patient continues to benefit from skilled intervention to address the above impairments. Continue treatment per established plan of care. Discharge Recommendations:  Home Health Further Equipment Recommendations for Discharge:  RW has been delivered SUBJECTIVE:  
Patient stated I am ready to go.  OBJECTIVE DATA SUMMARY:  
Critical Behavior: 
Neurologic State: Alert Orientation Level: Oriented X4 Cognition: Appropriate for age attention/concentration Safety/Judgement: Awareness of environment, Fall prevention Functional Mobility Training: 
Bed Mobility: 
  
Supine to Sit: Minimum assistance; Additional time Transfers: 
Sit to Stand: Contact guard assistance Stand to Sit: Stand-by assistance Balance: 
Sitting: Intact Standing: Impaired Standing - Static: Good Standing - Dynamic : FairAmbulation/Gait Training: 
Distance (ft): 70 Feet (ft) Assistive Device: Gait belt;Walker, rolling Ambulation - Level of Assistance: Stand-by assistance Gait Abnormalities: Antalgic;Decreased step clearance Base of Support: Narrowed Stance: Right decreased Speed/Antonette: Slow;Pace decreased (<100 feet/min) Step Length: Right shortened;Left shortened Swing Pattern: Right asymmetrical 
  
  
  
  
  
Stairs: 
  
  
   
 
Neuro Re-Education: 
 
Therapeutic Exercises:  
 
Pain: 
Pain Scale 1: Numeric (0 - 10) Pain Intensity 1: 6 Pain Location 1: Hip Pain Orientation 1: Right Pain Description 1: Aching Pain Intervention(s) 1: Medication (see MAR); Ice Activity Tolerance:  
Good Please refer to the flowsheet for vital signs taken during this treatment. After treatment:  
[x]    Patient left in no apparent distress sitting up in chair 
[]    Patient left in no apparent distress in bed 
[x]    Call bell left within reach [x]    Nursing notified 
[]    Caregiver present 
[]    Bed alarm activated COMMUNICATION/COLLABORATION:  
The patients plan of care was discussed with: Registered Nurse Beth Ochoa, PT Time Calculation: 25 mins

## 2019-01-15 NOTE — PROGRESS NOTES
Bedside and Verbal shift change report given to [facility] (oncoming nurse) by Jean Paul Pathak (offgoing nurse). Report included the following information SBAR, Kardex, OR Summary, Procedure Summary, Intake/Output, MAR and Recent Results.

## 2019-01-15 NOTE — PROGRESS NOTES
Pain ok R hip dressings dry Calves soft NT 
NVI Cont PT partial wt bearing, flat foot gait pattern 
lovenox 40 mg daily x2 weeks postop, then ASA 81 mg bid for 2 weeks 
disch planning F/u with me in approx 3 weeks; [phone] 
 
Kera Brannonch, MD

## 2019-01-15 NOTE — PROGRESS NOTES
The Joint Replacement Discharge Education video was reviewed by the patient/family. The content was discussed utilizing teach back, questions were answered. The patient verbalized an understanding of the instructions. I have reviewed discharge instructions with the patient and daughter. The patient and daughter verbalized understanding. Pt received hard rxs. Pt was taught how to do lovenox injections and watched the video. I called pt's pharmacy to make sure pharmacy has lovenox.

## 2019-01-15 NOTE — PROGRESS NOTES
Problem: Falls - Risk of 
Goal: *Absence of Falls Document Dexter Spence Fall Risk and appropriate interventions in the flowsheet. Outcome: Progressing Towards Goal 
Fall Risk Interventions: 
Mobility Interventions: Patient to call before getting OOB Medication Interventions: Patient to call before getting OOB Elimination Interventions: Patient to call for help with toileting needs History of Falls Interventions: Door open when patient unattended

## 2019-01-15 NOTE — PROGRESS NOTES
Spiritual Care Partner Volunteer visited patient in Rm 573 on 1/15/19. Documented by: Chaplain Oliver MDiv, MACE 
[phone])

## 2019-01-15 NOTE — PROGRESS NOTES
Problem: Self Care Deficits Care Plan (Adult) Goal: *Acute Goals and Plan of Care (Insert Text) Occupational Therapy Goals Initiated 1/13/2019 1. Patient will perform lower body ADLs with AE supervision/set-up within 4 day(s). 2.  Patient will perform upper body ADLs standing 5 mins without fatigue or LOB with supervision/set-up within 4 day(s). 3.  Patient will perform toilet transfer with supervision/set-up within 4 day(s). 4.  Patient will perform all aspects of toileting with supervision/set-up within 4 day(s). 5.  Patient will participate in upper extremity therapeutic exercise/activities with supervision/set-up for 10 minutes within 4 day(s). 6.  Patient will utilize energy conservation techniques during functional activities without cues within 4 day(s). Occupational Therapy Note:  
 
Pt received sitting in chair and completing dressing activities with daughter. Pt did well with all tasks this am.   
 
Tub/Shower transfers:  
Discussed technique for transferring into a tub/shower combos . Pt educated to step in with strong leg and to come out with operated leg to ensure safety with task. Pt instructed to have a family member or friend to assist pt when they attempt to get in the shower for the first time. Pt educated they can use the wall for steady balance as needed. Pt also educated she can use the [facility] as a seat as needed for the shower. Discussed technique to bring operated leg into the shower and provided demonstration for task. Pt reporting understanding of training. A&P:  Pt is independent with training and education for all activities. Recommend home with family support and daughter willing and able to assist her at home. Ely Hemphill OT Time Calculation: 19 mins

## 2019-01-15 NOTE — DISCHARGE SUMMARY
Discharge Summary     PATIENT ID: Edith Gibbs  MRN: [id number]   YOB: 1945    DATE OF ADMISSION: 1/11/2019  5:33 PM    DATE OF DISCHARGE: 1/15/2019  PRIMARY CARE PROVIDER: Belinda Pan MD   ATTENDING PHYSICIAN: Valeri Vitale MD  DISCHARGING PROVIDER: Mike Ascencio NP. To contact this individual call [phone] and ask the  to page. If unavailable ask to be transferred the Adult Hospitalist Department. CONSULTATIONS: IP CONSULT TO ORTHOPEDIC SURGERY    PROCEDURES/SURGERIES: Procedure(s):  RIGHT FEMUR INSERTION INTRA MEDULLARY NAIL    ADMITTING 01 [facility] COURSE:   Pt presented to the ED with R hip pain. Apparently pt was visiting her daughter at the time of her injury. Per reports, the pt was going to see the pts pig and when it started to move around, she stepped down a step to avoid him and fell. Denies head trauma during fall. She was unable to get up after the fall and had subsequent hip pain. DISCHARGE DIAGNOSES / PLAN:      Displaced intertochanteric fracture of rt femur:  - X ray -acute mildly displaced intertrochanteric fracture of the proximal right femur. .  - s/p intramedullary fixation on 1/12/2019  - pain management with tylenol and prn oxycodone  - bowel regimen  - DVT ppx post-op: lovenox x 14 days, then 81 mg baby ASA x 14 days then may resume home 81 mg ASA daily  - Home today with HH      HTN:  - continue metoprolol.  - Hold hctz as BP soft - pt instructed to take BP daily and keep record for PCP to review, may restart Hctz in ~ 1 week     Hypokalemia: replete and continue BID repletion at home  - should follow up with PCP in ~1 week for lab check     CKD stage 2: monitor creatinine     Leukocytosis: resolved -stress related     FOLLOW UP APPOINTMENTS:    Follow-up Information     Follow up With Specialties Details Why Contact Info    Belinda Pan MD Family Practice In 1 week make an appt in 1-2 weeks, blood pressure and lab check [address]  [phone]      Renny Cm MD Orthopedic Surgery In 3 weeks  [address] [id number]  [phone]      [address]  [place] [address]  [phone]         ADDITIONAL CARE RECOMMENDATIONS:   - Please check blood pressure daily and keep record. - Restart Hydrochlorothiazide next week (Monday 1/21) and continue to monitor blood pressures. - we will CONTINUE potassium on discharge as potassium levels are running on low side    - Suggest contacting your PCP for a post hospital check up for labs (check potassium) and BP check    Activity   Walk with your walker: Continue Physical therapy, partial wt bearing, flat foot gait pattern. Continue using your walker until seen for follow-up visit.  Practice your exercises 3 times a day as instructed by the physical therapist.  Mariah Common up frequently and walk (with assistance as needed)   You may not drive     Incision Care   The Aquacel (brown, waterproof) surgical dressing is to remain on the hip for 7 days. On the 7th day gently peel the dressing off by carefully lifting the edge and stretching it slightly to break the adhesive seal   If your Aquacel dressing comes loose/off before the 7th day, you may replace it with a dry sterile gauze dressing; change it daily. Once the incision is not draining, leave it open to air   You may take a shower with the Aquacel dressing in place. Once the Aquacel is removed,  may shower and get your incision wet but do not submerge your incision under water in a bath tub, hot tub or swimming pool for 6 weeks after surgery. Preventing blood clots   Take Lovenox for the next 11 days (1/16 through 1/26) and then take 81 mg baby aspirin twice daily (1/27-2/9) and then you may resume your usual dose of baby aspirin daily.  Wear elastic stockings (TEDS) for 4 weeks.   Remove them for approximately 1 hour daily for showering/sponge bathing    Pain management   Take pain medication as prescribed; decrease the amount you take as your pain lessens. Suggest 650 mg Tylenol every 6 hours OR 1000 mg Tylenol every 8 hours is suggested for the next few weeks. Also will have a prescription for Oxycodone 5 mg every 4-6 hours for severe pain   Avoid alcoholic beverages while taking pain medications   Place an ice bag on the hip for 15-20 minutes after exercising and as needed throughout the day and night    Diet   Resume usual diet; encourage fluids; eat foods high in fiber, calcium and vitamin D.   You may want to take a stool softener (such as Senokot-S or Colace) to prevent constipation while you are taking pain medication.  If constipation occurs, take a laxative (such as Dulcolax tablets, Miralax, or a suppository)    When to call your Orthopaedic Surgeon.  If you call after 5pm or on a weekend, the on call physician will be contacted   Pain that is not relieved by pain medication, ice, activity   Signs of infection  o Incision is reddened  o Incision continues to drain; drainage has an odor  o Persistent fever over 101 degrees   Signs of a blood clot in your leg  o Calf pain, tenderness, redness and/or swelling of lower leg    When to call your Primary Care Physician   Concerns about medical conditions such as diabetes, high blood pressure, asthma, congestive heart failure   Call if blood sugars are elevated, persistent headache or dizziness, coughing or congestion, constipation or diarrhea, burning with urination, abnormal heart rate (slow or fast)    When to call 911 and go to the nearest emergency room   Acute onset of chest pain, shortness of breath, difficulty breathing    DISCHARGE MEDICATIONS:  Discharge Medication List as of 1/15/2019 11:04 AM      START taking these medications    Details   calcium-vitamin D (OYSTER SHELL) 500 mg(1,250mg) -200 unit per tablet Take 1 Tab by mouth three (3) times daily (with meals). , Print, Disp-90 Tab, R-0      enoxaparin (LOVENOX) 40 mg/0.4 mL 0.4 mL by SubCUTAneous route daily. , Print, Disp-11 Syringe, R-0      oxyCODONE IR (ROXICODONE) 5 mg immediate release tablet Take 1 Tab by mouth every four (4) hours as needed. Max Daily Amount: 30 mg., Print, Disp-20 Tab, R-0      senna-docusate (PERICOLACE) 8.6-50 mg per tablet Take 1 Tab by mouth two (2) times a day., Print, Disp-60 Tab, R-0      polyethylene glycol (MIRALAX) 17 gram packet Take 1 Packet by mouth daily. , Print, Disp-30 Packet, R-0         CONTINUE these medications which have CHANGED    Details   !! aspirin 81 mg chewable tablet Take 1 Tab by mouth two (2) times a day for 14 days. Start taking 1/27 through 2/9 (twice daily), Normal, Disp-28 Tab, R-0      potassium chloride (KLOR-CON M20) 20 mEq tablet Take 1 Tab by mouth two (2) times a day., Print, Disp-60 Tab, R-0      !! aspirin 81 mg chewable tablet Take 1 Tab by mouth daily. Start this dose 2/10 (usual dose), Print, Disp-30 Tab, R-0       !! - Potential duplicate medications found. Please discuss with provider. CONTINUE these medications which have NOT CHANGED    Details   metoprolol succinate (TOPROL-XL) 25 mg XL tablet Take 12.5 mg by mouth daily. , Historical Med      acyclovir (ZOVIRAX) 200 mg capsule Take 200 mg by mouth every four (4) hours (while awake). , Historical Med      atorvastatin (LIPITOR) 20 mg tablet Take  by mouth daily. , Historical Med      pantoprazole (PROTONIX) 40 mg tablet Take 40 mg by mouth daily. , Historical Med      estrogen, conjugated,-medroxyPROGESTERone (PREMPRO) 0.3-1.5 mg tab Take 1 Tab by mouth daily. , Historical Med      estradiol-norethindrone (MIMVEY) 1-0.5 mg per tablet Take 1 Tab by mouth daily. , Historical Med         STOP taking these medications       hydrochlorothiazide (HYDRODIURIL) 25 mg tablet Comments:   Reason for Stopping:             NOTIFY YOUR PHYSICIAN FOR ANY OF THE FOLLOWING:   Fever over 101 degrees for 24 hours. Chest pain, shortness of breath, fever, chills, nausea, vomiting, diarrhea, change in mentation, falling, weakness, bleeding. Severe pain or pain not relieved by medications. Or, any other signs or symptoms that you may have questions about. DISPOSITION:   xx Home With:  xx OT xx PT xx HH  RN       Long term SNF/Inpatient Rehab    Independent/assisted living    Hospice    Other:     PATIENT CONDITION AT DISCHARGE:   Functional status    Poor    xx Deconditioned     Independent      Cognition    xx Lucid     Forgetful     Dementia      Catheters/lines (plus indication)    Bowen     PICC     PEG    xx None      Code status    xx Full code     DNR      PHYSICAL EXAMINATION AT DISCHARGE:  /72 (BP 1 Location: Left arm, BP Patient Position: Sitting)   Pulse 88   Temp 98.4 °F (36.9 °C)   Resp 16   Ht 5' 3\" (1.6 m)   Wt 77.7 kg (171 lb 6.4 oz)   SpO2 98%   BMI 30.36 kg/m²     Pt sitting up in chair - anticipation discharge today. We discussed discharge medications, need for follow up and some chnages to her existing regimen. Assured her these would be given to her in written form as well. Constitutional:  No acute distress, cooperative, pleasant    ENT:  Oral MM moist, oropharynx benign. Resp:  CTA bilaterally. No wheezing/rhonchi. No accessory muscle use and on RA   CV:  Regular rhythm, normal rate, no murmurs    GI:  Soft, non distended, non tender. normoactive bowel sounds, - BM    Musculoskeletal:  was able to move the LLE, surgical site dressing intact.  Mild swelling to thigh    Neurologic:  no focal deficits                          CHRONIC MEDICAL DIAGNOSES:  Problem List as of 1/15/2019 Date Reviewed: 1/15/2019          Codes Class Noted - Resolved    * (Principal) [facility] ICD-10-CM: I84.777T  ICD-9-CM: 820.8  1/11/2019 - Present            Greater than 30 minutes were spent with the patient on counseling and coordination of care    Signed:   [facility] Mike Yancey NP  1/15/2019  5:24 PM

## 2019-01-16 ENCOUNTER — HOME CARE VISIT (OUTPATIENT)
Dept: SCHEDULING | Facility: HOME HEALTH | Age: 74
End: 2019-01-16
Payer: MEDICARE

## 2019-01-16 PROCEDURE — 400013 HH SOC

## 2019-01-16 PROCEDURE — G0151 HHCP-SERV OF PT,EA 15 MIN: HCPCS

## 2019-01-16 PROCEDURE — 3331090001 HH PPS REVENUE CREDIT

## 2019-01-16 PROCEDURE — 3331090002 HH PPS REVENUE DEBIT

## 2019-01-17 PROCEDURE — 3331090001 HH PPS REVENUE CREDIT

## 2019-01-17 PROCEDURE — 3331090002 HH PPS REVENUE DEBIT

## 2019-01-18 ENCOUNTER — HOME CARE VISIT (OUTPATIENT)
Dept: SCHEDULING | Facility: HOME HEALTH | Age: 74
End: 2019-01-18
Payer: MEDICARE

## 2019-01-18 VITALS
TEMPERATURE: 98.5 F | SYSTOLIC BLOOD PRESSURE: 132 MMHG | DIASTOLIC BLOOD PRESSURE: 72 MMHG | WEIGHT: 170 LBS | HEART RATE: 84 BPM | BODY MASS INDEX: 30.12 KG/M2 | OXYGEN SATURATION: 98 % | HEIGHT: 63 IN

## 2019-01-18 VITALS — HEART RATE: 82 BPM | SYSTOLIC BLOOD PRESSURE: 132 MMHG | OXYGEN SATURATION: 96 % | DIASTOLIC BLOOD PRESSURE: 78 MMHG

## 2019-01-18 PROCEDURE — G0151 HHCP-SERV OF PT,EA 15 MIN: HCPCS

## 2019-01-18 PROCEDURE — 3331090002 HH PPS REVENUE DEBIT

## 2019-01-18 PROCEDURE — 3331090001 HH PPS REVENUE CREDIT

## 2019-01-19 PROCEDURE — 3331090002 HH PPS REVENUE DEBIT

## 2019-01-19 PROCEDURE — 3331090001 HH PPS REVENUE CREDIT

## 2019-01-20 PROCEDURE — 3331090001 HH PPS REVENUE CREDIT

## 2019-01-20 PROCEDURE — 3331090002 HH PPS REVENUE DEBIT

## 2019-01-21 ENCOUNTER — HOME CARE VISIT (OUTPATIENT)
Dept: SCHEDULING | Facility: HOME HEALTH | Age: 74
End: 2019-01-21
Payer: MEDICARE

## 2019-01-21 VITALS
HEART RATE: 91 BPM | DIASTOLIC BLOOD PRESSURE: 80 MMHG | SYSTOLIC BLOOD PRESSURE: 135 MMHG | OXYGEN SATURATION: 99 % | TEMPERATURE: 98.4 F

## 2019-01-21 PROCEDURE — 3331090002 HH PPS REVENUE DEBIT

## 2019-01-21 PROCEDURE — 3331090001 HH PPS REVENUE CREDIT

## 2019-01-21 PROCEDURE — G0157 HHC PT ASSISTANT EA 15: HCPCS

## 2019-01-22 ENCOUNTER — HOME CARE VISIT (OUTPATIENT)
Dept: SCHEDULING | Facility: HOME HEALTH | Age: 74
End: 2019-01-22
Payer: MEDICARE

## 2019-01-22 VITALS
TEMPERATURE: 97.4 F | OXYGEN SATURATION: 98 % | DIASTOLIC BLOOD PRESSURE: 72 MMHG | HEART RATE: 93 BPM | RESPIRATION RATE: 18 BRPM | SYSTOLIC BLOOD PRESSURE: 116 MMHG

## 2019-01-22 PROCEDURE — 3331090002 HH PPS REVENUE DEBIT

## 2019-01-22 PROCEDURE — G0157 HHC PT ASSISTANT EA 15: HCPCS

## 2019-01-22 PROCEDURE — 3331090001 HH PPS REVENUE CREDIT

## 2019-01-23 PROCEDURE — 3331090002 HH PPS REVENUE DEBIT

## 2019-01-23 PROCEDURE — 3331090001 HH PPS REVENUE CREDIT

## 2019-01-24 ENCOUNTER — HOME CARE VISIT (OUTPATIENT)
Dept: SCHEDULING | Facility: HOME HEALTH | Age: 74
End: 2019-01-24
Payer: MEDICARE

## 2019-01-24 VITALS
RESPIRATION RATE: 18 BRPM | DIASTOLIC BLOOD PRESSURE: 75 MMHG | SYSTOLIC BLOOD PRESSURE: 132 MMHG | OXYGEN SATURATION: 99 % | TEMPERATURE: 98.2 F | HEART RATE: 104 BPM

## 2019-01-24 PROCEDURE — G0157 HHC PT ASSISTANT EA 15: HCPCS

## 2019-01-24 PROCEDURE — 3331090002 HH PPS REVENUE DEBIT

## 2019-01-24 PROCEDURE — 3331090001 HH PPS REVENUE CREDIT

## 2019-01-25 PROCEDURE — 3331090002 HH PPS REVENUE DEBIT

## 2019-01-25 PROCEDURE — 3331090001 HH PPS REVENUE CREDIT

## 2019-01-26 PROCEDURE — 3331090002 HH PPS REVENUE DEBIT

## 2019-01-26 PROCEDURE — 3331090001 HH PPS REVENUE CREDIT

## 2019-01-27 PROCEDURE — 3331090001 HH PPS REVENUE CREDIT

## 2019-01-27 PROCEDURE — 3331090002 HH PPS REVENUE DEBIT

## 2019-01-28 PROCEDURE — 3331090002 HH PPS REVENUE DEBIT

## 2019-01-28 PROCEDURE — 3331090001 HH PPS REVENUE CREDIT

## 2019-01-29 ENCOUNTER — HOME CARE VISIT (OUTPATIENT)
Dept: SCHEDULING | Facility: HOME HEALTH | Age: 74
End: 2019-01-29
Payer: MEDICARE

## 2019-01-29 VITALS
TEMPERATURE: 97.3 F | DIASTOLIC BLOOD PRESSURE: 88 MMHG | OXYGEN SATURATION: 99 % | HEART RATE: 91 BPM | RESPIRATION RATE: 18 BRPM | SYSTOLIC BLOOD PRESSURE: 133 MMHG

## 2019-01-29 PROCEDURE — 3331090002 HH PPS REVENUE DEBIT

## 2019-01-29 PROCEDURE — 3331090001 HH PPS REVENUE CREDIT

## 2019-01-29 PROCEDURE — G0157 HHC PT ASSISTANT EA 15: HCPCS

## 2019-01-30 PROCEDURE — 3331090002 HH PPS REVENUE DEBIT

## 2019-01-30 PROCEDURE — 3331090001 HH PPS REVENUE CREDIT

## 2019-01-31 ENCOUNTER — HOME CARE VISIT (OUTPATIENT)
Dept: SCHEDULING | Facility: HOME HEALTH | Age: 74
End: 2019-01-31
Payer: MEDICARE

## 2019-01-31 VITALS
SYSTOLIC BLOOD PRESSURE: 104 MMHG | HEART RATE: 87 BPM | OXYGEN SATURATION: 99 % | DIASTOLIC BLOOD PRESSURE: 82 MMHG | RESPIRATION RATE: 18 BRPM | TEMPERATURE: 98.1 F

## 2019-01-31 PROCEDURE — G0157 HHC PT ASSISTANT EA 15: HCPCS

## 2019-01-31 PROCEDURE — 3331090001 HH PPS REVENUE CREDIT

## 2019-01-31 PROCEDURE — 3331090002 HH PPS REVENUE DEBIT

## 2019-02-01 ENCOUNTER — HOME CARE VISIT (OUTPATIENT)
Dept: SCHEDULING | Facility: HOME HEALTH | Age: 74
End: 2019-02-01
Payer: MEDICARE

## 2019-02-01 VITALS
DIASTOLIC BLOOD PRESSURE: 66 MMHG | TEMPERATURE: 97.9 F | OXYGEN SATURATION: 99 % | SYSTOLIC BLOOD PRESSURE: 122 MMHG | RESPIRATION RATE: 18 BRPM | HEART RATE: 83 BPM

## 2019-02-01 PROCEDURE — 3331090002 HH PPS REVENUE DEBIT

## 2019-02-01 PROCEDURE — 3331090001 HH PPS REVENUE CREDIT

## 2019-02-01 PROCEDURE — G0157 HHC PT ASSISTANT EA 15: HCPCS

## 2019-02-02 PROCEDURE — 3331090001 HH PPS REVENUE CREDIT

## 2019-02-02 PROCEDURE — 3331090002 HH PPS REVENUE DEBIT

## 2019-02-03 PROCEDURE — 3331090002 HH PPS REVENUE DEBIT

## 2019-02-03 PROCEDURE — 3331090001 HH PPS REVENUE CREDIT

## 2019-02-04 ENCOUNTER — HOME CARE VISIT (OUTPATIENT)
Dept: SCHEDULING | Facility: HOME HEALTH | Age: 74
End: 2019-02-04
Payer: MEDICARE

## 2019-02-04 ENCOUNTER — HOSPITAL ENCOUNTER (OUTPATIENT)
Dept: ULTRASOUND IMAGING | Age: 74
Discharge: HOME | End: 2019-02-04
Attending: ORTHOPAEDIC SURGERY
Payer: MEDICARE

## 2019-02-04 VITALS
SYSTOLIC BLOOD PRESSURE: 116 MMHG | OXYGEN SATURATION: 99 % | HEART RATE: 82 BPM | DIASTOLIC BLOOD PRESSURE: 84 MMHG | TEMPERATURE: 97.8 F | RESPIRATION RATE: 18 BRPM

## 2019-02-04 DIAGNOSIS — M79.661 RIGHT CALF PAIN: ICD-10-CM

## 2019-02-04 PROCEDURE — 93971 EXTREMITY STUDY: CPT

## 2019-02-04 PROCEDURE — 3331090002 HH PPS REVENUE DEBIT

## 2019-02-04 PROCEDURE — G0157 HHC PT ASSISTANT EA 15: HCPCS

## 2019-02-04 PROCEDURE — 3331090001 HH PPS REVENUE CREDIT

## 2019-02-05 ENCOUNTER — HOME CARE VISIT (OUTPATIENT)
Dept: SCHEDULING | Facility: HOME HEALTH | Age: 74
End: 2019-02-05
Payer: MEDICARE

## 2019-02-05 VITALS
OXYGEN SATURATION: 99 % | HEART RATE: 75 BPM | RESPIRATION RATE: 18 BRPM | SYSTOLIC BLOOD PRESSURE: 120 MMHG | DIASTOLIC BLOOD PRESSURE: 77 MMHG | TEMPERATURE: 97.5 F

## 2019-02-05 PROCEDURE — 3331090002 HH PPS REVENUE DEBIT

## 2019-02-05 PROCEDURE — 3331090001 HH PPS REVENUE CREDIT

## 2019-02-05 PROCEDURE — G0157 HHC PT ASSISTANT EA 15: HCPCS

## 2019-02-06 PROCEDURE — 3331090002 HH PPS REVENUE DEBIT

## 2019-02-06 PROCEDURE — 3331090001 HH PPS REVENUE CREDIT

## 2019-02-07 ENCOUNTER — HOME CARE VISIT (OUTPATIENT)
Dept: SCHEDULING | Facility: HOME HEALTH | Age: 74
End: 2019-02-07
Payer: MEDICARE

## 2019-02-07 PROCEDURE — 3331090001 HH PPS REVENUE CREDIT

## 2019-02-07 PROCEDURE — G0151 HHCP-SERV OF PT,EA 15 MIN: HCPCS

## 2019-02-07 PROCEDURE — 3331090003 HH PPS REVENUE ADJ

## 2019-02-07 PROCEDURE — 3331090002 HH PPS REVENUE DEBIT

## 2019-02-08 PROCEDURE — 3331090001 HH PPS REVENUE CREDIT

## 2019-02-08 PROCEDURE — 3331090002 HH PPS REVENUE DEBIT

## 2019-02-09 PROCEDURE — 3331090001 HH PPS REVENUE CREDIT

## 2019-02-09 PROCEDURE — 3331090002 HH PPS REVENUE DEBIT

## 2019-02-10 PROCEDURE — 3331090001 HH PPS REVENUE CREDIT

## 2019-02-10 PROCEDURE — 3331090002 HH PPS REVENUE DEBIT

## 2019-08-07 ENCOUNTER — HOSPITAL ENCOUNTER (OUTPATIENT)
Dept: MAMMOGRAPHY | Age: 74
Discharge: HOME | End: 2019-08-07
Attending: FAMILY MEDICINE
Payer: MEDICARE

## 2019-08-07 DIAGNOSIS — Z12.31 ENCOUNTER FOR SCREENING MAMMOGRAM FOR MALIGNANT NEOPLASM OF BREAST: ICD-10-CM

## 2019-08-07 PROCEDURE — 77067 SCR MAMMO BI INCL CAD: CPT

## (undated) DEVICE — SUTURE MCRYL SZ 4-0 L27IN ABSRB UD L24MM PS-1 3/8 CIR PRIM Y935H

## (undated) DEVICE — SOLUTION IRRIG 1000ML H2O STRL BLT

## (undated) DEVICE — DRAPE SURG W41XL74IN CLR FULL SZ C ARM 3 ADH POLY STRP E

## (undated) DEVICE — NON-ADHERENT STRIPS,OIL EMULSION: Brand: CURITY

## (undated) DEVICE — REM POLYHESIVE ADULT PATIENT RETURN ELECTRODE: Brand: VALLEYLAB

## (undated) DEVICE — ROD RMR L950MM DIA2.5MM W/ EXTN BALL TIP

## (undated) DEVICE — GAUZE SPONGES,12 PLY: Brand: CURITY

## (undated) DEVICE — DRAPE,REIN 53X77,STERILE: Brand: MEDLINE

## (undated) DEVICE — SOLUTION IV 1000ML 0.9% SOD CHL

## (undated) DEVICE — HOOK LOCK LATEX FREE ELASTIC BANDAGE 4INX5YD

## (undated) DEVICE — APPLICATOR BNDG 1MM ADH PREMIERPRO EXOFIN

## (undated) DEVICE — INTENDED FOR TISSUE SEPARATION, AND OTHER PROCEDURES THAT REQUIRE A SHARP SURGICAL BLADE TO PUNCTURE OR CUT.: Brand: BARD-PARKER ® CARBON RIB-BACK BLADES

## (undated) DEVICE — 6619 2 PTNT ISO SYS INCISE AREA&LT;(&GT;&&LT;)&GT;P: Brand: STERI-DRAPE™ IOBAN™ 2

## (undated) DEVICE — SLIM BODY SKIN STAPLER: Brand: APPOSE ULC

## (undated) DEVICE — 1200 GUARD II KIT W/5MM TUBE W/O VAC TUBE: Brand: GUARDIAN

## (undated) DEVICE — DEVON™ KNEE AND BODY STRAP 60" X 3" (1.5 M X 7.6 CM): Brand: DEVON

## (undated) DEVICE — 3.2MM GUIDE WIRE 400MM

## (undated) DEVICE — DRSG AQUACEL SURG 3.5X6IN -- CONVERT TO ITEM 369227

## (undated) DEVICE — PREP SKN PREVAIL 40ML APPL --

## (undated) DEVICE — SUTURE VCRL SZ 2-0 L27IN ABSRB UD L36MM CP-1 1/2 CIR REV J266H

## (undated) DEVICE — PADDING CST 4INX4YD --

## (undated) DEVICE — STERILE POLYISOPRENE POWDER-FREE SURGICAL GLOVES WITH EMOLLIENT COATING: Brand: PROTEXIS

## (undated) DEVICE — Device

## (undated) DEVICE — SUTURE VCRL SZ 1 L27IN ABSRB UD L36MM CP-1 1/2 CIR REV CUT J268H

## (undated) DEVICE — PACK,BASIC,SIRUS,V: Brand: MEDLINE